# Patient Record
Sex: FEMALE | Race: OTHER | HISPANIC OR LATINO | URBAN - METROPOLITAN AREA
[De-identification: names, ages, dates, MRNs, and addresses within clinical notes are randomized per-mention and may not be internally consistent; named-entity substitution may affect disease eponyms.]

---

## 2021-02-17 ENCOUNTER — INPATIENT (INPATIENT)
Facility: HOSPITAL | Age: 56
LOS: 3 days | Discharge: HOME | DRG: 177 | End: 2021-02-21
Attending: HOSPITALIST | Admitting: HOSPITALIST
Payer: COMMERCIAL

## 2021-02-17 VITALS
HEART RATE: 103 BPM | OXYGEN SATURATION: 96 % | HEIGHT: 64 IN | RESPIRATION RATE: 18 BRPM | SYSTOLIC BLOOD PRESSURE: 147 MMHG | DIASTOLIC BLOOD PRESSURE: 80 MMHG | TEMPERATURE: 102 F

## 2021-02-17 DIAGNOSIS — U07.1 COVID-19: ICD-10-CM

## 2021-02-17 LAB
ALBUMIN SERPL ELPH-MCNC: 3 G/DL — LOW (ref 3.3–5)
ALBUMIN SERPL ELPH-MCNC: 3 G/DL — LOW (ref 3.3–5)
ALP SERPL-CCNC: 89 U/L — SIGNIFICANT CHANGE UP (ref 40–120)
ALP SERPL-CCNC: 95 U/L — SIGNIFICANT CHANGE UP (ref 40–120)
ALT FLD-CCNC: 32 U/L — SIGNIFICANT CHANGE UP (ref 12–78)
ALT FLD-CCNC: 35 U/L — SIGNIFICANT CHANGE UP (ref 12–78)
ANION GAP SERPL CALC-SCNC: 8 MMOL/L — SIGNIFICANT CHANGE UP (ref 5–17)
AST SERPL-CCNC: 29 U/L — SIGNIFICANT CHANGE UP (ref 15–37)
AST SERPL-CCNC: 30 U/L — SIGNIFICANT CHANGE UP (ref 15–37)
BASOPHILS # BLD AUTO: 0.01 K/UL — SIGNIFICANT CHANGE UP (ref 0–0.2)
BASOPHILS NFR BLD AUTO: 0.1 % — SIGNIFICANT CHANGE UP (ref 0–2)
BILIRUB DIRECT SERPL-MCNC: <.1 MG/DL — SIGNIFICANT CHANGE UP (ref 0.05–0.2)
BILIRUB INDIRECT FLD-MCNC: >0.1 MG/DL — LOW (ref 0.2–1)
BILIRUB SERPL-MCNC: 0.2 MG/DL — SIGNIFICANT CHANGE UP (ref 0.2–1.2)
BILIRUB SERPL-MCNC: 0.3 MG/DL — SIGNIFICANT CHANGE UP (ref 0.2–1.2)
BUN SERPL-MCNC: 11 MG/DL — SIGNIFICANT CHANGE UP (ref 7–23)
CALCIUM SERPL-MCNC: 8.3 MG/DL — LOW (ref 8.5–10.1)
CHLORIDE SERPL-SCNC: 109 MMOL/L — HIGH (ref 96–108)
CO2 SERPL-SCNC: 25 MMOL/L — SIGNIFICANT CHANGE UP (ref 22–31)
CREAT SERPL-MCNC: 0.81 MG/DL — SIGNIFICANT CHANGE UP (ref 0.5–1.3)
CREAT SERPL-MCNC: 0.87 MG/DL — SIGNIFICANT CHANGE UP (ref 0.5–1.3)
CRP SERPL-MCNC: 5.6 MG/DL — HIGH (ref 0–0.4)
D DIMER BLD IA.RAPID-MCNC: 360 NG/ML DDU — HIGH
EOSINOPHIL # BLD AUTO: 0 K/UL — SIGNIFICANT CHANGE UP (ref 0–0.5)
EOSINOPHIL NFR BLD AUTO: 0 % — SIGNIFICANT CHANGE UP (ref 0–6)
FERRITIN SERPL-MCNC: 233 NG/ML — HIGH (ref 15–150)
GLUCOSE SERPL-MCNC: 115 MG/DL — HIGH (ref 70–99)
HCT VFR BLD CALC: 37.6 % — SIGNIFICANT CHANGE UP (ref 34.5–45)
HGB BLD-MCNC: 12.3 G/DL — SIGNIFICANT CHANGE UP (ref 11.5–15.5)
IMM GRANULOCYTES NFR BLD AUTO: 0.2 % — SIGNIFICANT CHANGE UP (ref 0–1.5)
LYMPHOCYTES # BLD AUTO: 1.24 K/UL — SIGNIFICANT CHANGE UP (ref 1–3.3)
LYMPHOCYTES # BLD AUTO: 15.3 % — SIGNIFICANT CHANGE UP (ref 13–44)
MCHC RBC-ENTMCNC: 27.1 PG — SIGNIFICANT CHANGE UP (ref 27–34)
MCHC RBC-ENTMCNC: 32.7 GM/DL — SIGNIFICANT CHANGE UP (ref 32–36)
MCV RBC AUTO: 82.8 FL — SIGNIFICANT CHANGE UP (ref 80–100)
MONOCYTES # BLD AUTO: 0.6 K/UL — SIGNIFICANT CHANGE UP (ref 0–0.9)
MONOCYTES NFR BLD AUTO: 7.4 % — SIGNIFICANT CHANGE UP (ref 2–14)
NEUTROPHILS # BLD AUTO: 6.22 K/UL — SIGNIFICANT CHANGE UP (ref 1.8–7.4)
NEUTROPHILS NFR BLD AUTO: 77 % — SIGNIFICANT CHANGE UP (ref 43–77)
NRBC # BLD: 0 /100 WBCS — SIGNIFICANT CHANGE UP (ref 0–0)
PLATELET # BLD AUTO: 231 K/UL — SIGNIFICANT CHANGE UP (ref 150–400)
POTASSIUM SERPL-MCNC: 4 MMOL/L — SIGNIFICANT CHANGE UP (ref 3.5–5.3)
POTASSIUM SERPL-SCNC: 4 MMOL/L — SIGNIFICANT CHANGE UP (ref 3.5–5.3)
PROCALCITONIN SERPL-MCNC: <0.05 NG/ML — HIGH (ref 0–0.04)
PROT SERPL-MCNC: 7.4 G/DL — SIGNIFICANT CHANGE UP (ref 6–8.3)
PROT SERPL-MCNC: 7.7 G/DL — SIGNIFICANT CHANGE UP (ref 6–8.3)
RBC # BLD: 4.54 M/UL — SIGNIFICANT CHANGE UP (ref 3.8–5.2)
RBC # FLD: 13.1 % — SIGNIFICANT CHANGE UP (ref 10.3–14.5)
SARS-COV-2 IGG SERPL QL IA: POSITIVE
SARS-COV-2 IGM SERPL IA-ACNC: 6.19 INDEX — HIGH
SODIUM SERPL-SCNC: 142 MMOL/L — SIGNIFICANT CHANGE UP (ref 135–145)
WBC # BLD: 8.09 K/UL — SIGNIFICANT CHANGE UP (ref 3.8–10.5)
WBC # FLD AUTO: 8.09 K/UL — SIGNIFICANT CHANGE UP (ref 3.8–10.5)

## 2021-02-17 PROCEDURE — 99223 1ST HOSP IP/OBS HIGH 75: CPT

## 2021-02-17 PROCEDURE — 74176 CT ABD & PELVIS W/O CONTRAST: CPT | Mod: 26,MA

## 2021-02-17 PROCEDURE — 71250 CT THORAX DX C-: CPT | Mod: 26,MA

## 2021-02-17 PROCEDURE — 93010 ELECTROCARDIOGRAM REPORT: CPT

## 2021-02-17 PROCEDURE — 71045 X-RAY EXAM CHEST 1 VIEW: CPT | Mod: 26

## 2021-02-17 PROCEDURE — 99285 EMERGENCY DEPT VISIT HI MDM: CPT

## 2021-02-17 RX ORDER — ACETAMINOPHEN 500 MG
650 TABLET ORAL EVERY 6 HOURS
Refills: 0 | Status: DISCONTINUED | OUTPATIENT
Start: 2021-02-17 | End: 2021-02-21

## 2021-02-17 RX ORDER — DEXAMETHASONE 0.5 MG/5ML
6 ELIXIR ORAL ONCE
Refills: 0 | Status: COMPLETED | OUTPATIENT
Start: 2021-02-17 | End: 2021-02-17

## 2021-02-17 RX ORDER — ONDANSETRON 8 MG/1
4 TABLET, FILM COATED ORAL ONCE
Refills: 0 | Status: COMPLETED | OUTPATIENT
Start: 2021-02-17 | End: 2021-02-17

## 2021-02-17 RX ORDER — SODIUM CHLORIDE 9 MG/ML
1000 INJECTION INTRAMUSCULAR; INTRAVENOUS; SUBCUTANEOUS ONCE
Refills: 0 | Status: COMPLETED | OUTPATIENT
Start: 2021-02-17 | End: 2021-02-17

## 2021-02-17 RX ORDER — ACETAMINOPHEN 500 MG
1000 TABLET ORAL ONCE
Refills: 0 | Status: COMPLETED | OUTPATIENT
Start: 2021-02-17 | End: 2021-02-17

## 2021-02-17 RX ORDER — ONDANSETRON 8 MG/1
4 TABLET, FILM COATED ORAL ONCE
Refills: 0 | Status: COMPLETED | OUTPATIENT
Start: 2021-02-17 | End: 2021-02-18

## 2021-02-17 RX ORDER — REMDESIVIR 5 MG/ML
200 INJECTION INTRAVENOUS EVERY 24 HOURS
Refills: 0 | Status: COMPLETED | OUTPATIENT
Start: 2021-02-17 | End: 2021-02-17

## 2021-02-17 RX ORDER — METOCLOPRAMIDE HCL 10 MG
10 TABLET ORAL ONCE
Refills: 0 | Status: COMPLETED | OUTPATIENT
Start: 2021-02-17 | End: 2021-02-17

## 2021-02-17 RX ORDER — REMDESIVIR 5 MG/ML
INJECTION INTRAVENOUS
Refills: 0 | Status: COMPLETED | OUTPATIENT
Start: 2021-02-17 | End: 2021-02-21

## 2021-02-17 RX ORDER — FAMOTIDINE 10 MG/ML
20 INJECTION INTRAVENOUS ONCE
Refills: 0 | Status: COMPLETED | OUTPATIENT
Start: 2021-02-17 | End: 2021-02-17

## 2021-02-17 RX ORDER — ENOXAPARIN SODIUM 100 MG/ML
40 INJECTION SUBCUTANEOUS EVERY 12 HOURS
Refills: 0 | Status: DISCONTINUED | OUTPATIENT
Start: 2021-02-17 | End: 2021-02-21

## 2021-02-17 RX ORDER — REMDESIVIR 5 MG/ML
100 INJECTION INTRAVENOUS EVERY 24 HOURS
Refills: 0 | Status: COMPLETED | OUTPATIENT
Start: 2021-02-18 | End: 2021-02-21

## 2021-02-17 RX ADMIN — ONDANSETRON 4 MILLIGRAM(S): 8 TABLET, FILM COATED ORAL at 05:52

## 2021-02-17 RX ADMIN — FAMOTIDINE 20 MILLIGRAM(S): 10 INJECTION INTRAVENOUS at 08:05

## 2021-02-17 RX ADMIN — SODIUM CHLORIDE 1000 MILLILITER(S): 9 INJECTION INTRAMUSCULAR; INTRAVENOUS; SUBCUTANEOUS at 13:35

## 2021-02-17 RX ADMIN — Medication 400 MILLIGRAM(S): at 06:07

## 2021-02-17 RX ADMIN — Medication 6 MILLIGRAM(S): at 05:51

## 2021-02-17 RX ADMIN — SODIUM CHLORIDE 1000 MILLILITER(S): 9 INJECTION INTRAMUSCULAR; INTRAVENOUS; SUBCUTANEOUS at 05:52

## 2021-02-17 RX ADMIN — ENOXAPARIN SODIUM 40 MILLIGRAM(S): 100 INJECTION SUBCUTANEOUS at 22:33

## 2021-02-17 RX ADMIN — SODIUM CHLORIDE 1000 MILLILITER(S): 9 INJECTION INTRAMUSCULAR; INTRAVENOUS; SUBCUTANEOUS at 12:35

## 2021-02-17 RX ADMIN — Medication 10 MILLIGRAM(S): at 08:08

## 2021-02-17 RX ADMIN — ONDANSETRON 4 MILLIGRAM(S): 8 TABLET, FILM COATED ORAL at 12:15

## 2021-02-17 RX ADMIN — REMDESIVIR 500 MILLIGRAM(S): 5 INJECTION INTRAVENOUS at 22:33

## 2021-02-17 NOTE — H&P ADULT - ASSESSMENT
Ms Mcdaniels is a 56 yo F presenting from home with nausea and vomiting recently diagnosed with COVID-19. PMH Kidney stones.     COVID-19 infection:   -patient is saturating well on room air.   -no indication for Decadron at this time  -start Remdesivir  -consult Infectious Disease, Dr Green  -monitor LFT's and renal function.   -nausea is improving. Will advance to PO diet and monitor  -CT chest, abdomen, pelvis reviewed.     DVT ppx: Lovenox    Anticipate discharge to home in next 24 hours.

## 2021-02-17 NOTE — ED ADULT NURSE NOTE - ED STAT RN HANDOFF DETAILS
Bedside report given to on coming nurse Lata Understands pmh, medications given and plan of care for patient. Patient in stable condition, vital signs updated, has no complaints at this time and has been updated on care plan. Explained to patient that it is change of shift and new nurse is taking over, pt verbalized understanding.

## 2021-02-17 NOTE — ED PROVIDER NOTE - PHYSICAL EXAMINATION
Gen: Alert, NAD  Head/eyes: NC/AT, PERRL  ENT: airway patent  Neck: supple, no tenderness/meningismus/JVD, Trachea midline  Pulm/lung: coarse breath sound b/l at bases, normal resp effort, no wheeze/stridor/retractions  CV/heart: RRR, no M/R/G, +2 dist pulses (radial, pedal DP/PT, popliteal)  GI/Abd: soft, NT/ND, +BS, no guarding/rebound tenderness  Musculoskeletal: no edema/erythema/cyanosis, FROM in all extremities, no C/T/L spine ttp  Skin: no rash, no vesicles, no petechaie, no ecchymosis, no swelling  Neuro: AAOx3, CN 2-12 intact, normal sensation, 5/5 motor strength in all extremities, normal gait, no dysmetria

## 2021-02-17 NOTE — ED ADULT NURSE NOTE - PMH
Cervicogenic Migraine    Herniated Disc    Hyperlipidemia    Hyperparathyroidism    Renal Calculus or Stone

## 2021-02-17 NOTE — H&P ADULT - NSHPPHYSICALEXAM_GEN_ALL_CORE
T(C): 36.9 (02-17-21 @ 07:44), Max: 38.8 (02-17-21 @ 05:01)  HR: 88 (02-17-21 @ 12:00) (88 - 103)  BP: 128/70 (02-17-21 @ 12:00) (128/70 - 147/80)  RR: 18 (02-17-21 @ 12:00) (18 - 18)  SpO2: 94% (02-17-21 @ 12:00) (92% - 96%)  Wt(kg): --    Physical Exam:   GENERAL: well-groomed, well-developed, NAD  HEENT: head NC/AT; EOM intact, conjunctiva & sclera clear; hearing grossly intact, moist mucous membranes  NECK: supple, no JVD  RESPIRATORY: decreased breath sounds at b/l lung bases, no wheezing  CARDIOVASCULAR: S1&S2, RRR, no murmurs or gallops  ABDOMEN: soft, non-tender, non-distended, + Bowel sounds x4 quadrants, no guarding, rebound or rigidity  MUSCULOSKELETAL:  no clubbing, cyanosis or edema of all 4 extremities  LYMPH: no cervical lymphadenopathy  VASCULAR: Radial pulses 2+ bilaterally, no varicose veins   SKIN: warm and dry, color normal  NEUROLOGIC: AA&O X3, CN2-12 intact w/ no focal deficits, no sensory loss, motor Strength 5/5 in UE & LE B/L  Psych: Normal mood and affect, normal behavior

## 2021-02-17 NOTE — ED ADULT NURSE REASSESSMENT NOTE - NS ED NURSE REASSESS COMMENT FT1
Report received from MADHU Kaplan.  Patient already has room assignment in hospital, report not given by previous RN.
Pt reports feeling better, She states she is not nauseous anymore.

## 2021-02-17 NOTE — H&P ADULT - NSICDXPASTSURGICALHX_GEN_ALL_CORE_FT
PAST SURGICAL HISTORY:  Breast Reduction     Status Post LASIK Surgery     Urethral Stone s/p urethral stents, multiple

## 2021-02-17 NOTE — H&P ADULT - HISTORY OF PRESENT ILLNESS
Ms Mcdaniels is a 56 yo F presenting from home with nausea and vomiting recently diagnosed with COVID-19. [facility] Kidney stones.   Patient seen and examined at bedside.   She reports getting tested for COVID-19 at [facility] on 2/10/21 and was positive. She has been having intermittent fevers and Anosmia, and myalgias. Today she developed a non-productive cough. She is also nauseous today with vomiting earlier in the day, but worsened during her ER visit. She was observed for approximately 9 hours in the ER but continued to have nausea and non-bloody vomiting. During my examination her nausea was resolved She has no other complaints.   Denies headaches, chest pain, SOB, palpitations, abdominal pain, constipation, diarrhea, melena, hematochezia, dysuria.   In ER she had a fever. CT chest and abdomen shows: Patchy groundglass opacifications in both lungs. Atypical viral pneumonia such as Covid cannot be excluded.    Small gallstone in the gallbladder. Mild dependent density in the gallbladder may represent sludge. No evidence for thickened gallbladder wall or pericholecystic fluid.    The appendix appears normal. Colonic diverticulosis without evidence for diverticulitis. No bowel obstruction, or grossly thickened bowel wall.

## 2021-02-17 NOTE — H&P ADULT - NSICDXPASTMEDICALHX_GEN_ALL_CORE_FT
PAST MEDICAL HISTORY:  Cervicogenic Migraine     Herniated Disc     Hyperlipidemia     Hyperparathyroidism     Renal Calculus or Stone

## 2021-02-17 NOTE — ED PROVIDER NOTE - NS ED ROS FT
Constitutional: + Fever, - Chills, - Anorexia, - Fatigue, - Night sweats  Eyes: - Discharge, - Irritation, - Redness, - Visual changes, - Light sensitivity, - Pain  EARS: - Ear Pain, - Tinnitus, - Decreased hearing  NOSE: - Congestion, - Bloody nose  MOUTH/THROAT: - Vocal Changes, - Drooling, - Sore throat  NECK: - Lumps, - Stiffness, - Pain  CV: - Palpitations, - Chest Pain, - Edema, - Syncope  RESP:  +Cough, - Shortness of Breath, - Dyspnea on Exertion, - Trouble speaking, - Pleuritic pain - Wheezing  GI: - Diarrhea, - Constipation, - Bloody stools, + Nausea, + Vomiting, - Abdominal Pain  : - Dysuria, -Frequency, - Hematuria, - Hesitancy, - Incontinence, - Saddle Anesthesia, - Abnormal discharge  MSK: - Myalgias, - Arthralgias, - Weakness, - Deformities, - Injuries  SKIN: - Color change, - Rash, - Swelling, - Ecchymosis, - Abrasion, - laceration  NEURO: - Change in behavior, - Dec. Alertness, - Headache, - Dizziness, - Change in speech, - Weakness, - Seizure-like activity, - Difficulty ambulating

## 2021-02-17 NOTE — CONSULT NOTE ADULT - SUBJECTIVE AND OBJECTIVE BOX
[facility]  INFECTIOUS DISEASES   =======================================================    [id number]  MYRANDA SNYDER     CC:  intractable nausea and vomiting     HPI:  56 yo woman with PMH of renal stone, HDL and hyperparathyroidism was admitted today with nausea and vomiting. She was diagnosed with COVID-19 recently. "She reports getting tested for COVID-19 at [facility] on 2/10/21 and was positive. She has been having intermittent fevers and Anosmia, and myalgias. Today she developed a non-productive cough. She is also nauseous today with vomiting earlier in the day, but worsened during her ER visit. She was observed for approximately 9 hours in the ER but continued to have nausea and non-bloody vomiting. During my examination her nausea was resolved She has no other complaints.   Denies headaches, chest pain, SOB, palpitations, abdominal pain, constipation, diarrhea, melena, hematochezia, dysuria.   In ER she had a fever.     PAST MEDICAL & SURGICAL HISTORY:  Hyperlipidemia  Cervicogenic Migraine  Herniated Disc  Renal Calculus or Stone  Hyperparathyroidism  Urethral Stone  s/p urethral stents, multiple  Status Post LASIK Surgery  Breast Reduction    Social Hx: no smoking, ETOH or drugs     FAMILY HISTORY:  No pertinent family history in first degree relatives    Allergies  contrast dye (Rash)  sulfa drugs (Rash)    Antibiotics:  remdesivir  IVPB 200 milliGRAM(s) IV Intermittent every 24 hours    REVIEW OF SYSTEMS:  CONSTITUTIONAL:  No Fever or chills  HEENT:  No diplopia or blurred vision.  No sore throat or runny nose.  CARDIOVASCULAR:  No chest pain or SOB.  RESPIRATORY:  +cough, no shortness of breath  GASTROINTESTINAL:  +nausea, vomiting and mild diarrhea.  GENITOURINARY:  No dysuria, frequency or urgency. No Blood in urine  MUSCULOSKELETAL:  no joint aches, no muscle pain  SKIN:  No change in skin, hair or nails.  NEUROLOGIC:  No paresthesias, fasciculations, seizures or weakness.  PSYCHIATRIC:  No disorder of thought or mood.  ENDOCRINE:  No heat or cold intolerance, polyuria or polydipsia.  HEMATOLOGICAL:  No easy bruising or bleeding.     Physical Exam:  Vital Signs Last 24 Hrs  T(C): 36.7 (17 Feb 2021 16:55), Max: 38.8 (17 Feb 2021 05:01)  T(F): 98 (17 Feb 2021 16:55), Max: 101.8 (17 Feb 2021 05:01)  HR: 86 (17 Feb 2021 16:55) (86 - 103)  BP: 130/68 (17 Feb 2021 16:55) (128/70 - 147/80)  RR: 16 (17 Feb 2021 16:55) (16 - 18)  SpO2: 95% (17 Feb 2021 16:55) (92% - 96%)  Height (cm): 162.6 (02-17 @ 05:01)  Weight (kg): 86.183 (02-17 @ 05:10)  BMI (kg/m2): 32.6 (02-17 @ 05:10)  BSA (m2): 1.91 (02-17 @ 05:10)  GEN: NAD  HEENT: normocephalic and atraumatic. EOMI. PERRL.    NECK: Supple.  No lymphadenopathy   LUNGS: Clear to auscultation.  HEART: Regular rate and rhythm   ABDOMEN: Soft, nontender, and nondistended.  Positive bowel sounds.    EXTREMITIES: Without edema.  NEUROLOGIC: grossly intact.  PSYCHIATRIC: Appropriate affect .  SKIN: No rash    Labs:  02-17    x   |  x   |  x   ----------------------------<  x   x    |  x   |  0.87    Ca    8.3<L>      17 Feb 2021 05:56    TPro  7.7  /  Alb  3.0<L>  /  TBili  0.2  /  DBili  <.10  /  AST  29  /  ALT  35  /  AlkPhos  95  02-17                        12.3   8.09  )-----------( 231      ( 17 Feb 2021 05:56 )             37.6     LIVER FUNCTIONS - ( 17 Feb 2021 16:50 )  Alb: 3.0 g/dL / Pro: 7.7 g/dL / ALK PHOS: 95 U/L / ALT: 35 U/L / AST: 29 U/L / GGT: x           All imaging and other data have been reviewed.  CT chest and abdomen 2/17 shows: Patchy ground-glass opacifications in both lungs. Atypical viral pneumonia such as Covid cannot be excluded.  Small gallstone in the gallbladder. Mild dependent density in the gallbladder may represent sludge. No evidence for thickened gallbladder wall or pericholecystic fluid.  The appendix appears normal. Colonic diverticulosis without evidence for diverticulitis. No bowel obstruction, or grossly thickened bowel wall.     Assessment and Plan:   56 yo woman with PMH of renal stone, HDL and hyperparathyroidism was admitted today with nausea and vomiting. She was diagnosed with COVID-19 on 2/10.     Treatment usually is different case by case, data suggest that these might work:   Remdisivir:  5 day course,  eGFR > 30 mL/min , ALT < 5X ULN  Steroid: For hypoxic patients on supplemental O2 of intubated. dexamethasone 6mg PO or IV Q-day x 10 days (equivalent to solumedrol 32mg IV or Prednisone 40mg)  Anticoagulation:  with prophylactic dosing, full dose to be considered in patients with increased risk for thromboembolic complications. Bleeding can happen but acceptable in high risk patients due to hypercoagulable state.  LMWH is good, for high risk patients consider discharge on oral anticoagulation with rivaroxaban (Xarelto) 10mg PO QD or Eliquis (apixaban) 2.5-5mg PO BID  x 4 weeks.  Currently data and recommendations for COVID-19 treatment are rapidly changing, so this treatment plan is based on my clinical judgement with available information.     COVID 19   - BMP, CBC w diff, NLR. Procalcitonin, Ferritin, CRP, LDH and D dimer for the start and periodically can be repeated.   - CXR with bilateral opacities more consistent with viral pneumonia   - Start Remdesivir 200mg stat and 100mg daily for 4 more days (total 5days) or until when ready for discharge whichever comes first.   - Hold on Dexamethasone unless develops hypoxia and needs supplemental O2, then start 6mg po daily for 10days  - Follow Creat and LFTs daily while on Remdesivir.    - Watch O2 sat closely   - No antibiotics at this time.  - Prophylactic anticoagulation due to hypercoagulable state    Thank you for courtesy of this consult.     Will follow.     Dr. Livia Green   Infectious Diseases   Please call [phone] between 8am and 6pm, call [phone] after 6pm or weekends.

## 2021-02-17 NOTE — ED ADULT NURSE NOTE - OBJECTIVE STATEMENT
Pt presents to ED with covid. Pt has been positive for 10 days. Pt reports feeling nauseous, has decreased  eating and drinking, fever/chills. Pt denies chest pain, SOB, cough, abd pain.

## 2021-02-17 NOTE — ED PROVIDER NOTE - CARE PLAN
Principal Discharge DX:	COVID-19  Secondary Diagnosis:	Nausea and vomiting, intractability of vomiting not specified, unspecified vomiting type

## 2021-02-17 NOTE — ED PROVIDER NOTE - CLINICAL SUMMARY MEDICAL DECISION MAKING FREE TEXT BOX
covid 19 infection with cough/fever/sob, +n/v, labs, IV fluids, IV antiemetics, IV antipyretics, likely admit

## 2021-02-17 NOTE — ED PROVIDER NOTE - OBJECTIVE STATEMENT
56 yo female hx of hld, renal stone dx'ed with covid 19 1 week ago c/o persistent fever 101.8 here, with cough, nausea/vomiting, unable to tolerate PO, severe weakness. Patient tried taking tylenol but vomited it up.    pmd Dr. Frank

## 2021-02-17 NOTE — ED ADULT NURSE NOTE - ED STAT RN HANDOFF DETAILS 2
Assumed care of pt from previous nurse, Yuliana RN in rm 1 a/o x4, no respiratory distress noted, even and unlabored breathing, abd soft BS + all 4 quads, nontender. IV access noted on the right a/c , flushes with ease. Skin warm and dry, + sensation, + capillary refill < 2 sec, all pulses are palpable. Pt c/o nausea and headache. Call bell within reach.

## 2021-02-18 LAB
ALBUMIN SERPL ELPH-MCNC: 2.8 G/DL — LOW (ref 3.3–5)
ALP SERPL-CCNC: 86 U/L — SIGNIFICANT CHANGE UP (ref 40–120)
ALT FLD-CCNC: 30 U/L — SIGNIFICANT CHANGE UP (ref 12–78)
ANION GAP SERPL CALC-SCNC: 11 MMOL/L — SIGNIFICANT CHANGE UP (ref 5–17)
APPEARANCE UR: ABNORMAL
AST SERPL-CCNC: 20 U/L — SIGNIFICANT CHANGE UP (ref 15–37)
BASOPHILS # BLD AUTO: 0.01 K/UL — SIGNIFICANT CHANGE UP (ref 0–0.2)
BASOPHILS NFR BLD AUTO: 0.1 % — SIGNIFICANT CHANGE UP (ref 0–2)
BILIRUB DIRECT SERPL-MCNC: 0.1 MG/DL — SIGNIFICANT CHANGE UP (ref 0.05–0.2)
BILIRUB INDIRECT FLD-MCNC: 0.2 MG/DL — SIGNIFICANT CHANGE UP (ref 0.2–1)
BILIRUB SERPL-MCNC: 0.3 MG/DL — SIGNIFICANT CHANGE UP (ref 0.2–1.2)
BILIRUB UR-MCNC: NEGATIVE — SIGNIFICANT CHANGE UP
BUN SERPL-MCNC: 14 MG/DL — SIGNIFICANT CHANGE UP (ref 7–23)
CALCIUM SERPL-MCNC: 9.3 MG/DL — SIGNIFICANT CHANGE UP (ref 8.5–10.1)
CHLORIDE SERPL-SCNC: 110 MMOL/L — HIGH (ref 96–108)
CO2 SERPL-SCNC: 25 MMOL/L — SIGNIFICANT CHANGE UP (ref 22–31)
COLOR SPEC: YELLOW — SIGNIFICANT CHANGE UP
CREAT SERPL-MCNC: 0.73 MG/DL — SIGNIFICANT CHANGE UP (ref 0.5–1.3)
D DIMER BLD IA.RAPID-MCNC: 334 NG/ML DDU — HIGH
DIFF PNL FLD: ABNORMAL
EOSINOPHIL # BLD AUTO: 0 K/UL — SIGNIFICANT CHANGE UP (ref 0–0.5)
EOSINOPHIL NFR BLD AUTO: 0 % — SIGNIFICANT CHANGE UP (ref 0–6)
GLUCOSE SERPL-MCNC: 111 MG/DL — HIGH (ref 70–99)
GLUCOSE UR QL: NEGATIVE — SIGNIFICANT CHANGE UP
HCT VFR BLD CALC: 38.1 % — SIGNIFICANT CHANGE UP (ref 34.5–45)
HCV AB S/CO SERPL IA: 0.09 S/CO — SIGNIFICANT CHANGE UP (ref 0–0.99)
HCV AB SERPL-IMP: SIGNIFICANT CHANGE UP
HGB BLD-MCNC: 12.2 G/DL — SIGNIFICANT CHANGE UP (ref 11.5–15.5)
IMM GRANULOCYTES NFR BLD AUTO: 0.6 % — SIGNIFICANT CHANGE UP (ref 0–1.5)
KETONES UR-MCNC: ABNORMAL
LEUKOCYTE ESTERASE UR-ACNC: NEGATIVE — SIGNIFICANT CHANGE UP
LYMPHOCYTES # BLD AUTO: 1.09 K/UL — SIGNIFICANT CHANGE UP (ref 1–3.3)
LYMPHOCYTES # BLD AUTO: 10.4 % — LOW (ref 13–44)
MCHC RBC-ENTMCNC: 26.4 PG — LOW (ref 27–34)
MCHC RBC-ENTMCNC: 32 GM/DL — SIGNIFICANT CHANGE UP (ref 32–36)
MCV RBC AUTO: 82.5 FL — SIGNIFICANT CHANGE UP (ref 80–100)
MONOCYTES # BLD AUTO: 0.78 K/UL — SIGNIFICANT CHANGE UP (ref 0–0.9)
MONOCYTES NFR BLD AUTO: 7.4 % — SIGNIFICANT CHANGE UP (ref 2–14)
NEUTROPHILS # BLD AUTO: 8.58 K/UL — HIGH (ref 1.8–7.4)
NEUTROPHILS NFR BLD AUTO: 81.5 % — HIGH (ref 43–77)
NITRITE UR-MCNC: NEGATIVE — SIGNIFICANT CHANGE UP
NRBC # BLD: 0 /100 WBCS — SIGNIFICANT CHANGE UP (ref 0–0)
PH UR: 5 — SIGNIFICANT CHANGE UP (ref 5–8)
PLATELET # BLD AUTO: 272 K/UL — SIGNIFICANT CHANGE UP (ref 150–400)
POTASSIUM SERPL-MCNC: 3.8 MMOL/L — SIGNIFICANT CHANGE UP (ref 3.5–5.3)
POTASSIUM SERPL-SCNC: 3.8 MMOL/L — SIGNIFICANT CHANGE UP (ref 3.5–5.3)
PROT SERPL-MCNC: 7.5 G/DL — SIGNIFICANT CHANGE UP (ref 6–8.3)
PROT UR-MCNC: 30 MG/DL
RBC # BLD: 4.62 M/UL — SIGNIFICANT CHANGE UP (ref 3.8–5.2)
RBC # FLD: 13.2 % — SIGNIFICANT CHANGE UP (ref 10.3–14.5)
SODIUM SERPL-SCNC: 146 MMOL/L — HIGH (ref 135–145)
SP GR SPEC: 1.01 — SIGNIFICANT CHANGE UP (ref 1.01–1.02)
UROBILINOGEN FLD QL: 1
WBC # BLD: 10.52 K/UL — HIGH (ref 3.8–10.5)
WBC # FLD AUTO: 10.52 K/UL — HIGH (ref 3.8–10.5)

## 2021-02-18 PROCEDURE — 99232 SBSQ HOSP IP/OBS MODERATE 35: CPT

## 2021-02-18 PROCEDURE — 99233 SBSQ HOSP IP/OBS HIGH 50: CPT

## 2021-02-18 RX ORDER — METOCLOPRAMIDE HCL 10 MG
10 TABLET ORAL ONCE
Refills: 0 | Status: COMPLETED | OUTPATIENT
Start: 2021-02-18 | End: 2021-02-18

## 2021-02-18 RX ORDER — ONDANSETRON 8 MG/1
4 TABLET, FILM COATED ORAL THREE TIMES A DAY
Refills: 0 | Status: DISCONTINUED | OUTPATIENT
Start: 2021-02-18 | End: 2021-02-21

## 2021-02-18 RX ORDER — SODIUM CHLORIDE 9 MG/ML
1000 INJECTION INTRAMUSCULAR; INTRAVENOUS; SUBCUTANEOUS
Refills: 0 | Status: DISCONTINUED | OUTPATIENT
Start: 2021-02-18 | End: 2021-02-19

## 2021-02-18 RX ADMIN — ENOXAPARIN SODIUM 40 MILLIGRAM(S): 100 INJECTION SUBCUTANEOUS at 20:55

## 2021-02-18 RX ADMIN — ONDANSETRON 4 MILLIGRAM(S): 8 TABLET, FILM COATED ORAL at 12:47

## 2021-02-18 RX ADMIN — ENOXAPARIN SODIUM 40 MILLIGRAM(S): 100 INJECTION SUBCUTANEOUS at 10:34

## 2021-02-18 RX ADMIN — Medication 10 MILLIGRAM(S): at 07:00

## 2021-02-18 RX ADMIN — REMDESIVIR 500 MILLIGRAM(S): 5 INJECTION INTRAVENOUS at 20:55

## 2021-02-18 RX ADMIN — ONDANSETRON 4 MILLIGRAM(S): 8 TABLET, FILM COATED ORAL at 05:20

## 2021-02-18 RX ADMIN — SODIUM CHLORIDE 100 MILLILITER(S): 9 INJECTION INTRAMUSCULAR; INTRAVENOUS; SUBCUTANEOUS at 09:29

## 2021-02-18 RX ADMIN — Medication 204 MILLIGRAM(S): at 16:14

## 2021-02-18 NOTE — CHART NOTE - NSCHARTNOTEFT_GEN_A_CORE
Called by RN, pt is nauseous and started vomiting. Pt has not eaten for the past few days. 2L BS bolus was given in the ED.   - Pt has received Zofran already with minimal relief  - Reglan ordered  - Continue to monitor, RN to call if any changes Called by RN, pt is nauseous and started vomiting. Pt has not eaten for the past few days. 2L BS bolus was given in the ED.   - Pt has received Zofran already with minimal relief  - VSS stable   - Reglan ordered  - Continue to monitor, RN to call if any changes

## 2021-02-18 NOTE — PROGRESS NOTE ADULT - ASSESSMENT
Assessment and Plan:   Ms Mcdaniels is a 54 yo F presenting from home with nausea and vomiting recently diagnosed with COVID-19. PMH Kidney stones.     COVID-19 infection:   -patient is saturating well on room air this am  -no indication for Decadron at this time  -started Remdesivir x 5 doses  -consult Infectious Disease, Dr Green  -monitor LFT's and renal function, cbc daily.   -continue VTE prophylaxis Lovenox.    Nausea and vomiting  -IV fluids  -Zofran and phenergan prn  NPO for now, will advance if improved.  -CT chest, abdomen, pelvis negative for obstruction  -UA ordered    Diarrhea  -likely secondary to covid  -continue IVF  -monitoring lytes         DVT ppx: Lovenox   Assessment and Plan:   Ms Mcdaniels is a 56 yo F presenting from home with nausea and vomiting recently diagnosed with COVID-19. PMH Kidney stones.     COVID-19 infection:   -patient is saturating well on room air this am  -no indication for Decadron at this time  -started Remdesivir x 5 doses  -consult Infectious Disease, Dr Green  -monitor LFT's and renal function, cbc daily.   -continue VTE prophylaxis Lovenox.    Nausea and vomiting  -IV fluids  -Zofran and phenergan prn  NPO for now, will advance if improved.  -CT chest, abdomen, pelvis negative for obstruction  -UA ordered    Diarrhea  -likely secondary to covid  -continue IVF  -monitoring lytes         DVT ppx: Lovenox    Called Tu, patient's emergency contact @ [phone] but not available, left VM

## 2021-02-19 LAB
ALBUMIN SERPL ELPH-MCNC: 2.5 G/DL — LOW (ref 3.3–5)
ALBUMIN SERPL ELPH-MCNC: 2.5 G/DL — LOW (ref 3.3–5)
ALP SERPL-CCNC: 71 U/L — SIGNIFICANT CHANGE UP (ref 40–120)
ALP SERPL-CCNC: 71 U/L — SIGNIFICANT CHANGE UP (ref 40–120)
ALT FLD-CCNC: 25 U/L — SIGNIFICANT CHANGE UP (ref 12–78)
ALT FLD-CCNC: 26 U/L — SIGNIFICANT CHANGE UP (ref 12–78)
ANION GAP SERPL CALC-SCNC: 10 MMOL/L — SIGNIFICANT CHANGE UP (ref 5–17)
AST SERPL-CCNC: 18 U/L — SIGNIFICANT CHANGE UP (ref 15–37)
AST SERPL-CCNC: 18 U/L — SIGNIFICANT CHANGE UP (ref 15–37)
BILIRUB DIRECT SERPL-MCNC: <.1 MG/DL — SIGNIFICANT CHANGE UP (ref 0.05–0.2)
BILIRUB INDIRECT FLD-MCNC: >0.2 MG/DL — SIGNIFICANT CHANGE UP (ref 0.2–1)
BILIRUB SERPL-MCNC: 0.3 MG/DL — SIGNIFICANT CHANGE UP (ref 0.2–1.2)
BILIRUB SERPL-MCNC: 0.3 MG/DL — SIGNIFICANT CHANGE UP (ref 0.2–1.2)
BUN SERPL-MCNC: 19 MG/DL — SIGNIFICANT CHANGE UP (ref 7–23)
CALCIUM SERPL-MCNC: 8.5 MG/DL — SIGNIFICANT CHANGE UP (ref 8.5–10.1)
CHLORIDE SERPL-SCNC: 115 MMOL/L — HIGH (ref 96–108)
CO2 SERPL-SCNC: 23 MMOL/L — SIGNIFICANT CHANGE UP (ref 22–31)
CREAT SERPL-MCNC: 0.62 MG/DL — SIGNIFICANT CHANGE UP (ref 0.5–1.3)
GLUCOSE SERPL-MCNC: 84 MG/DL — SIGNIFICANT CHANGE UP (ref 70–99)
HCT VFR BLD CALC: 36.1 % — SIGNIFICANT CHANGE UP (ref 34.5–45)
HGB BLD-MCNC: 11.5 G/DL — SIGNIFICANT CHANGE UP (ref 11.5–15.5)
MCHC RBC-ENTMCNC: 26.6 PG — LOW (ref 27–34)
MCHC RBC-ENTMCNC: 31.9 GM/DL — LOW (ref 32–36)
MCV RBC AUTO: 83.6 FL — SIGNIFICANT CHANGE UP (ref 80–100)
NRBC # BLD: 0 /100 WBCS — SIGNIFICANT CHANGE UP (ref 0–0)
PLATELET # BLD AUTO: 281 K/UL — SIGNIFICANT CHANGE UP (ref 150–400)
POTASSIUM SERPL-MCNC: 3.4 MMOL/L — LOW (ref 3.5–5.3)
POTASSIUM SERPL-SCNC: 3.4 MMOL/L — LOW (ref 3.5–5.3)
PROT SERPL-MCNC: 6.6 G/DL — SIGNIFICANT CHANGE UP (ref 6–8.3)
PROT SERPL-MCNC: 6.7 G/DL — SIGNIFICANT CHANGE UP (ref 6–8.3)
RBC # BLD: 4.32 M/UL — SIGNIFICANT CHANGE UP (ref 3.8–5.2)
RBC # FLD: 13 % — SIGNIFICANT CHANGE UP (ref 10.3–14.5)
SODIUM SERPL-SCNC: 148 MMOL/L — HIGH (ref 135–145)
WBC # BLD: 6.28 K/UL — SIGNIFICANT CHANGE UP (ref 3.8–10.5)
WBC # FLD AUTO: 6.28 K/UL — SIGNIFICANT CHANGE UP (ref 3.8–10.5)

## 2021-02-19 PROCEDURE — 99232 SBSQ HOSP IP/OBS MODERATE 35: CPT

## 2021-02-19 PROCEDURE — 76705 ECHO EXAM OF ABDOMEN: CPT | Mod: 26

## 2021-02-19 RX ORDER — POTASSIUM CHLORIDE 20 MEQ
40 PACKET (EA) ORAL ONCE
Refills: 0 | Status: DISCONTINUED | OUTPATIENT
Start: 2021-02-19 | End: 2021-02-19

## 2021-02-19 RX ORDER — DEXAMETHASONE 0.5 MG/5ML
6 ELIXIR ORAL DAILY
Refills: 0 | Status: DISCONTINUED | OUTPATIENT
Start: 2021-02-19 | End: 2021-02-21

## 2021-02-19 RX ORDER — SODIUM CHLORIDE 9 MG/ML
1000 INJECTION, SOLUTION INTRAVENOUS
Refills: 0 | Status: DISCONTINUED | OUTPATIENT
Start: 2021-02-19 | End: 2021-02-20

## 2021-02-19 RX ADMIN — Medication 6 MILLIGRAM(S): at 09:52

## 2021-02-19 RX ADMIN — REMDESIVIR 500 MILLIGRAM(S): 5 INJECTION INTRAVENOUS at 21:22

## 2021-02-19 RX ADMIN — ENOXAPARIN SODIUM 40 MILLIGRAM(S): 100 INJECTION SUBCUTANEOUS at 18:15

## 2021-02-19 RX ADMIN — SODIUM CHLORIDE 75 MILLILITER(S): 9 INJECTION, SOLUTION INTRAVENOUS at 15:45

## 2021-02-19 NOTE — PROGRESS NOTE ADULT - ASSESSMENT
Assessment and Plan:   Ms Mcdaniels is a 54 yo F presenting from home with nausea and vomiting recently diagnosed with COVID-19. PMH Kidney stones.     COVID-19 infection:   -patient started to desaturating on room air, now on 2L  -started on Decadron, continue Remdesivir x 5 doses  -consult Infectious Disease, Dr Green  -monitor LFT's and renal function, cbc daily.   -continue VTE prophylaxis Lovenox.    Nausea and vomiting  -Improved this am  -continue IV fluids  -Zofran and phenergan prn  -liquid diet for now, will advance if improved.  -CT chest, abdomen, pelvis negative for obstruction  -UA hematuria ( patient has h/o kidney stones) no nitrite or leucocyte esterase.  -RUQ US reported gallstones w/o sonographic evidence of acute cholecystitis, nonobstructive renal calculus       Diarrhea  -still present but less frequent  -likely secondary to covid  -continue IVF  -monitoring lytes         DVT ppx: Lovenox    Called Tu, patient's emergency contact @ [phone]

## 2021-02-20 ENCOUNTER — TRANSCRIPTION ENCOUNTER (OUTPATIENT)
Age: 56
End: 2021-02-20

## 2021-02-20 LAB
ALBUMIN SERPL ELPH-MCNC: 2.5 G/DL — LOW (ref 3.3–5)
ALBUMIN SERPL ELPH-MCNC: 2.6 G/DL — LOW (ref 3.3–5)
ALP SERPL-CCNC: 74 U/L — SIGNIFICANT CHANGE UP (ref 40–120)
ALP SERPL-CCNC: 74 U/L — SIGNIFICANT CHANGE UP (ref 40–120)
ALT FLD-CCNC: 22 U/L — SIGNIFICANT CHANGE UP (ref 12–78)
ALT FLD-CCNC: 24 U/L — SIGNIFICANT CHANGE UP (ref 12–78)
ANION GAP SERPL CALC-SCNC: 7 MMOL/L — SIGNIFICANT CHANGE UP (ref 5–17)
AST SERPL-CCNC: 10 U/L — LOW (ref 15–37)
AST SERPL-CCNC: 14 U/L — LOW (ref 15–37)
BILIRUB DIRECT SERPL-MCNC: 0.1 MG/DL — SIGNIFICANT CHANGE UP (ref 0.05–0.2)
BILIRUB INDIRECT FLD-MCNC: 0.2 MG/DL — SIGNIFICANT CHANGE UP (ref 0.2–1)
BILIRUB SERPL-MCNC: 0.3 MG/DL — SIGNIFICANT CHANGE UP (ref 0.2–1.2)
BILIRUB SERPL-MCNC: 0.4 MG/DL — SIGNIFICANT CHANGE UP (ref 0.2–1.2)
BUN SERPL-MCNC: 12 MG/DL — SIGNIFICANT CHANGE UP (ref 7–23)
CALCIUM SERPL-MCNC: 9.2 MG/DL — SIGNIFICANT CHANGE UP (ref 8.5–10.1)
CHLORIDE SERPL-SCNC: 111 MMOL/L — HIGH (ref 96–108)
CO2 SERPL-SCNC: 26 MMOL/L — SIGNIFICANT CHANGE UP (ref 22–31)
CREAT SERPL-MCNC: 0.58 MG/DL — SIGNIFICANT CHANGE UP (ref 0.5–1.3)
GLUCOSE SERPL-MCNC: 176 MG/DL — HIGH (ref 70–99)
HCT VFR BLD CALC: 37.9 % — SIGNIFICANT CHANGE UP (ref 34.5–45)
HGB BLD-MCNC: 12.3 G/DL — SIGNIFICANT CHANGE UP (ref 11.5–15.5)
MCHC RBC-ENTMCNC: 26.5 PG — LOW (ref 27–34)
MCHC RBC-ENTMCNC: 32.5 GM/DL — SIGNIFICANT CHANGE UP (ref 32–36)
MCV RBC AUTO: 81.5 FL — SIGNIFICANT CHANGE UP (ref 80–100)
NRBC # BLD: 0 /100 WBCS — SIGNIFICANT CHANGE UP (ref 0–0)
PLATELET # BLD AUTO: 332 K/UL — SIGNIFICANT CHANGE UP (ref 150–400)
POTASSIUM SERPL-MCNC: 4.1 MMOL/L — SIGNIFICANT CHANGE UP (ref 3.5–5.3)
POTASSIUM SERPL-SCNC: 4.1 MMOL/L — SIGNIFICANT CHANGE UP (ref 3.5–5.3)
PROT SERPL-MCNC: 6.9 G/DL — SIGNIFICANT CHANGE UP (ref 6–8.3)
PROT SERPL-MCNC: 7 G/DL — SIGNIFICANT CHANGE UP (ref 6–8.3)
RBC # BLD: 4.65 M/UL — SIGNIFICANT CHANGE UP (ref 3.8–5.2)
RBC # FLD: 12.7 % — SIGNIFICANT CHANGE UP (ref 10.3–14.5)
SODIUM SERPL-SCNC: 144 MMOL/L — SIGNIFICANT CHANGE UP (ref 135–145)
WBC # BLD: 4.18 K/UL — SIGNIFICANT CHANGE UP (ref 3.8–10.5)
WBC # FLD AUTO: 4.18 K/UL — SIGNIFICANT CHANGE UP (ref 3.8–10.5)

## 2021-02-20 PROCEDURE — 99232 SBSQ HOSP IP/OBS MODERATE 35: CPT

## 2021-02-20 RX ORDER — DEXAMETHASONE 0.5 MG/5ML
1 ELIXIR ORAL
Qty: 1 | Refills: 0
Start: 2021-02-20 | End: 2021-02-20

## 2021-02-20 RX ADMIN — ENOXAPARIN SODIUM 40 MILLIGRAM(S): 100 INJECTION SUBCUTANEOUS at 04:28

## 2021-02-20 RX ADMIN — Medication 6 MILLIGRAM(S): at 04:28

## 2021-02-20 RX ADMIN — ENOXAPARIN SODIUM 40 MILLIGRAM(S): 100 INJECTION SUBCUTANEOUS at 16:59

## 2021-02-20 RX ADMIN — REMDESIVIR 500 MILLIGRAM(S): 5 INJECTION INTRAVENOUS at 20:59

## 2021-02-20 NOTE — PROGRESS NOTE ADULT - ASSESSMENT
Assessment and Plan:   Ms Mcdaniels is a 54 yo F presenting from home with nausea and vomiting recently diagnosed with COVID-19. PMH Kidney stones.     COVID-19 infection:   -Continue on 2L  -continue Decadron to complete 10 days last dose 02/22/21, continue Remdesivir x 5, last dose 2/21/21  -follow by Infectious Disease, Dr Green  -monitor LFT's and renal function, cbc daily.   -continue VTE prophylaxis Lovenox.    Nausea and vomiting  -Improved this am  -d/c IVF, able to tolerate oral diet  -Zofran and phenergan prn  - advancing diet as olerated  -CT chest, abdomen, pelvis negative for obstruction  -UA hematuria ( patient has h/o kidney stones) no nitrite or leucocyte esterase.  -RUQ US reported gallstones w/o sonographic evidence of acute cholecystitis, nonobstructive renal calculus       Diarrhea  -no further episode since last night  -likely secondary to covid  -encourage oral hydration  -monitoring lytes     DVT ppx: Lovenox    Called Tu, patient's emergency contact @ [phone] and gave an update about patient's condition, informed him that patient will be discharge tomorrow

## 2021-02-20 NOTE — DISCHARGE NOTE PROVIDER - CARE PROVIDER_API CALL
Jose Francisco Frank  INTERNAL MEDICINE  [address], [address]  [place] [id number]  Phone: [phone]  Fax: [phone]  Follow Up Time:

## 2021-02-20 NOTE — DISCHARGE NOTE PROVIDER - NSDCCPCAREPLAN_GEN_ALL_CORE_FT
PRINCIPAL DISCHARGE DIAGNOSIS  Diagnosis: COVID-19  Assessment and Plan of Treatment: -Presented with nausea, vomiting , fever and sob  -treated with decadron and remdesivir  -required O2 before discharge  -SW consulted for home O2 and home care  -F/up after discharge within 1 week with PCP      SECONDARY DISCHARGE DIAGNOSES  Diagnosis: Diarrhea  Assessment and Plan of Treatment: -In setting of Covid  -received IV fluids  -Improved before discharge  -Reported some drops of blood after diarrhea, recommended to f/up with PCP to recheck hgb if necessary and for referral for GI for colonoscopy    Diagnosis: Nausea and vomiting, intractability of vomiting not specified, unspecified vomiting type  Assessment and Plan of Treatment: -Likely secondary to Covid infection, non bloody  -UA was negative for urine infection  -RUQ US was negative for acute cholecystitis  -Improved with zofran and phenergan  -Able to tolerate diet before discharge

## 2021-02-20 NOTE — DISCHARGE NOTE PROVIDER - HOSPITAL COURSE
FROM ADMISSION H+P:   HPI:  Ms Mcdaniels is a 54 yo F presenting from home with nausea and vomiting recently diagnosed with COVID-19. PMH Kidney stones.   Patient seen and examined at bedside.   She reports getting tested for COVID-19 at [facility] on 2/10/21 and was positive. She has been having intermittent fevers and Anosmia, and myalgias. Today she developed a non-productive cough. She is also nauseous today with vomiting earlier in the day, but worsened during her ER visit. She was observed for approximately 9 hours in the ER but continued to have nausea and non-bloody vomiting. During my examination her nausea was resolved She has no other complaints.   Denies headaches, chest pain, SOB, palpitations, abdominal pain, constipation, diarrhea, melena, hematochezia, dysuria.   In ER she had a fever. CT chest and abdomen shows: Patchy groundglass opacifications in both lungs. Atypical viral pneumonia such as Covid cannot be excluded.    Small gallstone in the gallbladder. Mild dependent density in the gallbladder may represent sludge. No evidence for thickened gallbladder wall or pericholecystic fluid.    The appendix appears normal. Colonic diverticulosis without evidence for diverticulitis. No bowel obstruction, or grossly thickened bowel wall. (17 Feb 2021 16:22)    ---  HOSPITAL COURSE:   COVID-19 infection:   -supported with 2L  -treated with  Decadron to complete 10 days last dose 02/22/21, and Remdesivir x 5 doses, last dose 2/21/21  -Was follow by Infectious Disease, Dr Green  -LFT's and renal function remained stable, as well cbc.   -VTE prophylaxis with Lovenox.  -required Home O2 before d/c ,  consulted for assistance     Nausea and vomiting  -NB, treated with IV fluids and antiemetics PRN. improved before discharge, patient was able to tolerate regular diet  -CT chest, abdomen, pelvis negative for obstruction  -UA (+) hematuria ( patient has h/o kidney stones) no nitrite or leucocyte esterase.  -RUQ US reported gallstones w/o sonographic evidence of acute cholecystitis, nonobstructive renal calculus       Diarrhea  -Improved gradually with IVF. able to tolerated diet before discharge, no further episode reported.   Patient mentioned phlegm and streaks of blood. Advice patient to f/up with PCP for referral with GI for colonoscopy    ---  CONSULTANTS:   ID    ---  TIME SPENT:  I, the attending physician, was physically present for the key portions of the evaluation and management (E/M) service provided. The total amount of time spent reviewing the hospital notes, laboratory values, imaging findings, assessing/counseling the patient, discussing with consultant physicians, social work, nursing staff was 35 minutes   FROM ADMISSION H+P:   HPI:  Ms Mcdaniels is a 54 yo F presenting from home with nausea and vomiting recently diagnosed with COVID-19. PMH Kidney stones.   Patient seen and examined at bedside.   She reports getting tested for COVID-19 at [facility] on 2/10/21 and was positive. She has been having intermittent fevers and Anosmia, and myalgias. Today she developed a non-productive cough. She is also nauseous today with vomiting earlier in the day, but worsened during her ER visit. She was observed for approximately 9 hours in the ER but continued to have nausea and non-bloody vomiting. During my examination her nausea was resolved She has no other complaints.   Denies headaches, chest pain, SOB, palpitations, abdominal pain, constipation, diarrhea, melena, hematochezia, dysuria.   In ER she had a fever. CT chest and abdomen shows: Patchy groundglass opacifications in both lungs. Atypical viral pneumonia such as Covid cannot be excluded.    Small gallstone in the gallbladder. Mild dependent density in the gallbladder may represent sludge. No evidence for thickened gallbladder wall or pericholecystic fluid.    The appendix appears normal. Colonic diverticulosis without evidence for diverticulitis. No bowel obstruction, or grossly thickened bowel wall. (17 Feb 2021 16:22)    ---  HOSPITAL COURSE:   COVID-19 infection:   -supported with 2L  -treated with  Decadron to complete 10 days last dose 02/22/21, and Remdesivir x 5 doses, last dose 2/21/21  -Was follow by Infectious Disease, Dr Green  -LFT's and renal function remained stable, as well cbc.   -VTE prophylaxis with Lovenox.  -required Home O2 before d/c ,  consulted for assistance     Nausea and vomiting  -NB, treated with IV fluids and antiemetics PRN. improved before discharge, patient was able to tolerate regular diet  -CT chest, abdomen, pelvis negative for obstruction  -UA (+) hematuria ( patient has h/o kidney stones) no nitrite or leucocyte esterase.  -RUQ US reported gallstones w/o sonographic evidence of acute cholecystitis, nonobstructive renal calculus       Diarrhea  -Improved gradually with IVF. able to tolerated diet before discharge, no further episode reported.   Patient mentioned phlegm and streaks of blood. Advice patient to f/up with PCP for referral with GI for colonoscopy    Patient required O2 support, during ambulation patient O2 concentration decrease to 85% w/o no O2 that increase to 94% with O2 NC 2L.at rest O2 saturation was 90%    ---  CONSULTANTS:   ID    ---  TIME SPENT:  I, the attending physician, was physically present for the key portions of the evaluation and management (E/M) service provided. The total amount of time spent reviewing the hospital notes, laboratory values, imaging findings, assessing/counseling the patient, discussing with consultant physicians, social work, nursing staff was 35 minutes

## 2021-02-20 NOTE — DISCHARGE NOTE PROVIDER - NSDCMRMEDTOKEN_GEN_ALL_CORE_FT
dexamethasone 6 mg oral tablet: 1 tab(s) orally once a day   TheraLith XR oral tablet: 1 tab(s) orally once a day

## 2021-02-21 ENCOUNTER — TRANSCRIPTION ENCOUNTER (OUTPATIENT)
Age: 56
End: 2021-02-21

## 2021-02-21 VITALS
RESPIRATION RATE: 20 BRPM | HEART RATE: 90 BPM | SYSTOLIC BLOOD PRESSURE: 129 MMHG | TEMPERATURE: 98 F | OXYGEN SATURATION: 93 % | DIASTOLIC BLOOD PRESSURE: 76 MMHG

## 2021-02-21 LAB
ALBUMIN SERPL ELPH-MCNC: 2.5 G/DL — LOW (ref 3.3–5)
ALBUMIN SERPL ELPH-MCNC: 2.5 G/DL — LOW (ref 3.3–5)
ALP SERPL-CCNC: 65 U/L — SIGNIFICANT CHANGE UP (ref 40–120)
ALP SERPL-CCNC: 65 U/L — SIGNIFICANT CHANGE UP (ref 40–120)
ALT FLD-CCNC: 21 U/L — SIGNIFICANT CHANGE UP (ref 12–78)
ALT FLD-CCNC: 23 U/L — SIGNIFICANT CHANGE UP (ref 12–78)
ANION GAP SERPL CALC-SCNC: 8 MMOL/L — SIGNIFICANT CHANGE UP (ref 5–17)
AST SERPL-CCNC: 9 U/L — LOW (ref 15–37)
AST SERPL-CCNC: 9 U/L — LOW (ref 15–37)
BILIRUB DIRECT SERPL-MCNC: <.1 MG/DL — SIGNIFICANT CHANGE UP (ref 0.05–0.2)
BILIRUB INDIRECT FLD-MCNC: >0.2 MG/DL — SIGNIFICANT CHANGE UP (ref 0.2–1)
BILIRUB SERPL-MCNC: 0.3 MG/DL — SIGNIFICANT CHANGE UP (ref 0.2–1.2)
BILIRUB SERPL-MCNC: 0.3 MG/DL — SIGNIFICANT CHANGE UP (ref 0.2–1.2)
BUN SERPL-MCNC: 18 MG/DL — SIGNIFICANT CHANGE UP (ref 7–23)
CALCIUM SERPL-MCNC: 8.6 MG/DL — SIGNIFICANT CHANGE UP (ref 8.5–10.1)
CHLORIDE SERPL-SCNC: 113 MMOL/L — HIGH (ref 96–108)
CO2 SERPL-SCNC: 26 MMOL/L — SIGNIFICANT CHANGE UP (ref 22–31)
CREAT SERPL-MCNC: 0.62 MG/DL — SIGNIFICANT CHANGE UP (ref 0.5–1.3)
GLUCOSE SERPL-MCNC: 122 MG/DL — HIGH (ref 70–99)
HCT VFR BLD CALC: 37 % — SIGNIFICANT CHANGE UP (ref 34.5–45)
HGB BLD-MCNC: 11.9 G/DL — SIGNIFICANT CHANGE UP (ref 11.5–15.5)
MCHC RBC-ENTMCNC: 26.3 PG — LOW (ref 27–34)
MCHC RBC-ENTMCNC: 32.2 GM/DL — SIGNIFICANT CHANGE UP (ref 32–36)
MCV RBC AUTO: 81.9 FL — SIGNIFICANT CHANGE UP (ref 80–100)
NRBC # BLD: 0 /100 WBCS — SIGNIFICANT CHANGE UP (ref 0–0)
PLATELET # BLD AUTO: 380 K/UL — SIGNIFICANT CHANGE UP (ref 150–400)
POTASSIUM SERPL-MCNC: 3.7 MMOL/L — SIGNIFICANT CHANGE UP (ref 3.5–5.3)
POTASSIUM SERPL-SCNC: 3.7 MMOL/L — SIGNIFICANT CHANGE UP (ref 3.5–5.3)
PROT SERPL-MCNC: 6.5 G/DL — SIGNIFICANT CHANGE UP (ref 6–8.3)
PROT SERPL-MCNC: 6.5 G/DL — SIGNIFICANT CHANGE UP (ref 6–8.3)
RBC # BLD: 4.52 M/UL — SIGNIFICANT CHANGE UP (ref 3.8–5.2)
RBC # FLD: 12.8 % — SIGNIFICANT CHANGE UP (ref 10.3–14.5)
SODIUM SERPL-SCNC: 147 MMOL/L — HIGH (ref 135–145)
WBC # BLD: 7.1 K/UL — SIGNIFICANT CHANGE UP (ref 3.8–10.5)
WBC # FLD AUTO: 7.1 K/UL — SIGNIFICANT CHANGE UP (ref 3.8–10.5)

## 2021-02-21 PROCEDURE — 85025 COMPLETE CBC W/AUTO DIFF WBC: CPT

## 2021-02-21 PROCEDURE — 96376 TX/PRO/DX INJ SAME DRUG ADON: CPT

## 2021-02-21 PROCEDURE — 99239 HOSP IP/OBS DSCHRG MGMT >30: CPT

## 2021-02-21 PROCEDURE — 71250 CT THORAX DX C-: CPT

## 2021-02-21 PROCEDURE — 96375 TX/PRO/DX INJ NEW DRUG ADDON: CPT

## 2021-02-21 PROCEDURE — 82728 ASSAY OF FERRITIN: CPT

## 2021-02-21 PROCEDURE — 87635 SARS-COV-2 COVID-19 AMP PRB: CPT

## 2021-02-21 PROCEDURE — 85027 COMPLETE CBC AUTOMATED: CPT

## 2021-02-21 PROCEDURE — 81001 URINALYSIS AUTO W/SCOPE: CPT

## 2021-02-21 PROCEDURE — 71045 X-RAY EXAM CHEST 1 VIEW: CPT

## 2021-02-21 PROCEDURE — 96374 THER/PROPH/DIAG INJ IV PUSH: CPT

## 2021-02-21 PROCEDURE — 99285 EMERGENCY DEPT VISIT HI MDM: CPT

## 2021-02-21 PROCEDURE — 85379 FIBRIN DEGRADATION QUANT: CPT

## 2021-02-21 PROCEDURE — 86140 C-REACTIVE PROTEIN: CPT

## 2021-02-21 PROCEDURE — U0005: CPT

## 2021-02-21 PROCEDURE — 76705 ECHO EXAM OF ABDOMEN: CPT

## 2021-02-21 PROCEDURE — 80053 COMPREHEN METABOLIC PANEL: CPT

## 2021-02-21 PROCEDURE — 84145 PROCALCITONIN (PCT): CPT

## 2021-02-21 PROCEDURE — 80048 BASIC METABOLIC PNL TOTAL CA: CPT

## 2021-02-21 PROCEDURE — 93005 ELECTROCARDIOGRAM TRACING: CPT

## 2021-02-21 PROCEDURE — 86769 SARS-COV-2 COVID-19 ANTIBODY: CPT

## 2021-02-21 PROCEDURE — 99232 SBSQ HOSP IP/OBS MODERATE 35: CPT

## 2021-02-21 PROCEDURE — 80076 HEPATIC FUNCTION PANEL: CPT

## 2021-02-21 PROCEDURE — 74176 CT ABD & PELVIS W/O CONTRAST: CPT

## 2021-02-21 PROCEDURE — 36415 COLL VENOUS BLD VENIPUNCTURE: CPT

## 2021-02-21 PROCEDURE — 86803 HEPATITIS C AB TEST: CPT

## 2021-02-21 PROCEDURE — 82565 ASSAY OF CREATININE: CPT

## 2021-02-21 RX ADMIN — REMDESIVIR 500 MILLIGRAM(S): 5 INJECTION INTRAVENOUS at 15:03

## 2021-02-21 RX ADMIN — ENOXAPARIN SODIUM 40 MILLIGRAM(S): 100 INJECTION SUBCUTANEOUS at 16:46

## 2021-02-21 RX ADMIN — Medication 6 MILLIGRAM(S): at 04:22

## 2021-02-21 RX ADMIN — ENOXAPARIN SODIUM 40 MILLIGRAM(S): 100 INJECTION SUBCUTANEOUS at 04:21

## 2021-02-21 NOTE — DISCHARGE NOTE NURSING/CASE MANAGEMENT/SOCIAL WORK - PATIENT PORTAL LINK FT
You can access the FollowMyHealth Patient Portal offered by [facility] by registering at the following website: [email or web address]. By joining [facility]’s FollowMyHealth portal, you will also be able to view your health information using other applications (apps) compatible with our system.

## 2021-02-21 NOTE — PROGRESS NOTE ADULT - SUBJECTIVE AND OBJECTIVE BOX
[facility]  INFECTIOUS DISEASES   =======================================================    [facility]-[id number]  MYRANDA SNYDER     Follow up: TAHIRA 19    Still has vomiting. today 6times, no diarrhea, no fever.     PAST MEDICAL & SURGICAL HISTORY:  Hyperlipidemia  Cervicogenic Migraine  Herniated Disc  Renal Calculus or Stone  Hyperparathyroidism  Urethral Stone  s/p urethral stents, multiple  Status Post LASIK Surgery  Breast Reduction    Social Hx: no smoking, ETOH or drugs     FAMILY HISTORY:  No pertinent family history in first degree relatives    Allergies  contrast dye (Rash)  sulfa drugs (Rash)    Antibiotics:  remdesivir  IVPB 200 milliGRAM(s) IV Intermittent every 24 hours    REVIEW OF SYSTEMS:  CONSTITUTIONAL:  No Fever or chills  HEENT:  No diplopia or blurred vision.  No sore throat or runny nose.  CARDIOVASCULAR:  No chest pain or SOB.  RESPIRATORY:  +cough, no shortness of breath  GASTROINTESTINAL:  +nausea, vomiting and mild diarrhea.  GENITOURINARY:  No dysuria, frequency or urgency. No Blood in urine  MUSCULOSKELETAL:  no joint aches, no muscle pain  SKIN:  No change in skin, hair or nails.  NEUROLOGIC:  No paresthesias, fasciculations, seizures or weakness.  PSYCHIATRIC:  No disorder of thought or mood.  ENDOCRINE:  No heat or cold intolerance, polyuria or polydipsia.  HEMATOLOGICAL:  No easy bruising or bleeding.     Physical Exam:  Vital Signs Last 24 Hrs  T(C): 37.2 (18 Feb 2021 16:32), Max: 37.2 (18 Feb 2021 05:29)  T(F): 98.9 (18 Feb 2021 16:32), Max: 98.9 (18 Feb 2021 05:29)  HR: 90 (18 Feb 2021 16:32) (71 - 106)  BP: 125/76 (18 Feb 2021 16:32) (124/76 - 149/88)  BP(mean): --  RR: 18 (18 Feb 2021 16:32) (16 - 18)  SpO2: 94% (18 Feb 2021 16:32) (91% - 94%)  GEN: NAD  HEENT: normocephalic and atraumatic. EOMI. PERRL.    NECK: Supple.  No lymphadenopathy   LUNGS: Clear to auscultation.  HEART: Regular rate and rhythm   ABDOMEN: Soft, nontender, and nondistended.  Positive bowel sounds.    EXTREMITIES: Without edema.  NEUROLOGIC: grossly intact.  PSYCHIATRIC: Appropriate affect .  SKIN: No rash      Labs:                        12.2   10.52 )-----------( 272      ( 18 Feb 2021 07:28 )             38.1     02-18    146<H>  |  110<H>  |  14  ----------------------------<  111<H>  3.8   |  25  |  0.73    Ca    9.3      18 Feb 2021 07:28    TPro  7.5  /  Alb  2.8<L>  /  TBili  0.3  /  DBili  .10  /  AST  20  /  ALT  30  /  AlkPhos  86  02-18    WBC Count: 10.52 K/uL (02-18-21 @ 07:28)  WBC Count: 8.09 K/uL (02-17-21 @ 05:56)    Creatinine, Serum: 0.73 mg/dL (02-18-21 @ 07:28)  Creatinine, Serum: 0.87 mg/dL (02-17-21 @ 16:50)  Creatinine, Serum: 0.81 mg/dL (02-17-21 @ 05:56)    C-Reactive Protein, Serum: 5.60 mg/dL (02-17-21 @ 11:05)    Ferritin, Serum: 233 ng/mL (02-17-21 @ 10:31)    Procalcitonin, Serum: <0.05 ng/mL (02-17-21 @ 05:56)     COVID-19 IgG Antibody Index: 6.19 Index (02-17-21 @ 22:31)  COVID-19 IgG Antibody Interpretation: Positive (02-17-21 @ 22:31)  COVID-19 PCR: Detected (02-17-21 @ 06:14)    All imaging and other data have been reviewed.  CT chest and abdomen 2/17 shows: Patchy ground-glass opacifications in both lungs. Atypical viral pneumonia such as Covid cannot be excluded.  Small gallstone in the gallbladder. Mild dependent density in the gallbladder may represent sludge. No evidence for thickened gallbladder wall or pericholecystic fluid.  The appendix appears normal. Colonic diverticulosis without evidence for diverticulitis. No bowel obstruction, or grossly thickened bowel wall.     Assessment and Plan:   56 yo woman with PMH of renal stone, HDL and hyperparathyroidism was admitted today with nausea and vomiting. She was diagnosed with COVID-19 on 2/10.     Treatment usually is different case by case, data suggest that these might work:   Remdisivir:  5 day course,  eGFR > 30 mL/min , ALT < 5X ULN  Steroid: For hypoxic patients on supplemental O2 of intubated. dexamethasone 6mg PO or IV Q-day x 10 days (equivalent to solumedrol 32mg IV or Prednisone 40mg)  Anticoagulation:  with prophylactic dosing, full dose to be considered in patients with increased risk for thromboembolic complications. Bleeding can happen but acceptable in high risk patients due to hypercoagulable state.  LMWH is good, for high risk patients consider discharge on oral anticoagulation with rivaroxaban (Xarelto) 10mg PO QD or Eliquis (apixaban) 2.5-5mg PO BID  x 4 weeks.  Currently data and recommendations for COVID-19 treatment are rapidly changing, so this treatment plan is based on my clinical judgement with available information.     COVID 19   - BMP, CBC w diff, NLR. Procalcitonin, Ferritin, CRP, LDH and D dimer for the start and periodically can be repeated.   - CXR with bilateral opacities more consistent with viral pneumonia   - continue Remdesivir to complete total 5days  - Hold on Dexamethasone unless develops hypoxia and needs supplemental O2, then start 6mg po daily for 10days  - Follow Creat and LFTs daily while on Remdesivir.    - Watch O2 sat closely   - No antibiotics at this time. PCT low   - Prophylactic anticoagulation due to hypercoagulable state   - On multiple antiemetics     Will follow.     Dr. Livia Green   Infectious Diseases   Please call [phone] between 8am and 6pm, call [phone] after 6pm or weekends.   
[facility]  INFECTIOUS DISEASES   =======================================================    [id number]  MYRANDA SNYDER     Follow up: COVID 19    No more vomiting and diarrhea.   Now on regular diet, tolerating well.   On o2 2L with NC with Spo2 in high 90s.     PAST MEDICAL & SURGICAL HISTORY:  Hyperlipidemia  Cervicogenic Migraine  Herniated Disc  Renal Calculus or Stone  Hyperparathyroidism  Urethral Stone  s/p urethral stents, multiple  Status Post LASIK Surgery  Breast Reduction    Social Hx: no smoking, ETOH or drugs     FAMILY HISTORY:  No pertinent family history in first degree relatives    Allergies  contrast dye (Rash)  sulfa drugs (Rash)    Antibiotics:  remdesivir  IVPB 200 milliGRAM(s) IV Intermittent every 24 hours    REVIEW OF SYSTEMS:  CONSTITUTIONAL:  No Fever or chills  HEENT:  No diplopia or blurred vision.  No sore throat or runny nose.  CARDIOVASCULAR:  No chest pain or SOB.  RESPIRATORY:  +cough, no shortness of breath  GASTROINTESTINAL:  +nausea, vomiting and mild diarrhea.  GENITOURINARY:  No dysuria, frequency or urgency. No Blood in urine  MUSCULOSKELETAL:  no joint aches, no muscle pain  SKIN:  No change in skin, hair or nails.  NEUROLOGIC:  No paresthesias, fasciculations, seizures or weakness.  PSYCHIATRIC:  No disorder of thought or mood.  ENDOCRINE:  No heat or cold intolerance, polyuria or polydipsia.  HEMATOLOGICAL:  No easy bruising or bleeding.     Physical Exam:  Vital Signs Last 24 Hrs  T(C): 36.8 (21 Feb 2021 04:41), Max: 36.8 (20 Feb 2021 16:08)  T(F): 98.3 (21 Feb 2021 04:41), Max: 98.3 (20 Feb 2021 16:08)  HR: 69 (21 Feb 2021 04:41) (69 - 77)  BP: 142/84 (21 Feb 2021 04:41) (129/79 - 142/84)  BP(mean): --  RR: 30 (21 Feb 2021 04:41) (22 - 30)  SpO2: 90% (21 Feb 2021 09:00) (85% - 95%)  GEN: NAD  HEENT: normocephalic and atraumatic. EOMI. PERRL.    NECK: Supple.  No lymphadenopathy   LUNGS: Clear to auscultation.  HEART: Regular rate and rhythm   ABDOMEN: Soft, nontender, and nondistended.  Positive bowel sounds.    EXTREMITIES: Without edema.  NEUROLOGIC: grossly intact.  PSYCHIATRIC: Appropriate affect .  SKIN: No rash    Labs:                        11.9   7.10  )-----------( 380      ( 21 Feb 2021 06:46 )             37.0     02-21    147<H>  |  113<H>  |  18  ----------------------------<  122<H>  3.7   |  26  |  0.62    Ca    8.6      21 Feb 2021 06:46    TPro  6.5  /  Alb  2.5<L>  /  TBili  0.3  /  DBili  <.10  /  AST  9<L>  /  ALT  23  /  AlkPhos  65  02-21    WBC Count: 7.10 K/uL (02-21-21 @ 06:46)  WBC Count: 4.18 K/uL (02-20-21 @ 06:53)  WBC Count: 6.28 K/uL (02-19-21 @ 06:29)  WBC Count: 10.52 K/uL (02-18-21 @ 07:28)  WBC Count: 8.09 K/uL (02-17-21 @ 05:56)    Creatinine, Serum: 0.62 mg/dL (02-21-21 @ 06:46)  Creatinine, Serum: 0.58 mg/dL (02-20-21 @ 06:53)  Creatinine, Serum: 0.62 mg/dL (02-19-21 @ 06:29)  Creatinine, Serum: 0.73 mg/dL (02-18-21 @ 07:28)  Creatinine, Serum: 0.87 mg/dL (02-17-21 @ 16:50)  Creatinine, Serum: 0.81 mg/dL (02-17-21 @ 05:56)    C-Reactive Protein, Serum: 5.60 mg/dL (02-17-21 @ 11:05)    Ferritin, Serum: 233 ng/mL (02-17-21 @ 10:31)    Procalcitonin, Serum: <0.05 ng/mL (02-17-21 @ 05:56)     COVID-19 IgG Antibody Index: 6.19 Index (02-17-21 @ 22:31)  COVID-19 IgG Antibody Interpretation: Positive (02-17-21 @ 22:31)  COVID-19 PCR: Detected (02-17-21 @ 06:14)    All imaging and other data have been reviewed.  CT chest and abdomen 2/17 shows: Patchy ground-glass opacifications in both lungs. Atypical viral pneumonia such as Covid cannot be excluded.  Small gallstone in the gallbladder. Mild dependent density in the gallbladder may represent sludge. No evidence for thickened gallbladder wall or pericholecystic fluid.  The appendix appears normal. Colonic diverticulosis without evidence for diverticulitis. No bowel obstruction, or grossly thickened bowel wall.     Assessment and Plan:   54 yo woman with PMH of renal stone, HDL and hyperparathyroidism was admitted today with nausea and vomiting. She was diagnosed with COVID-19 on 2/10.     Treatment usually is different case by case, data suggest that these might work:   Remdisivir:  5 day course,  eGFR > 30 mL/min , ALT < 5X ULN  Steroid: For hypoxic patients on supplemental O2 of intubated. dexamethasone 6mg PO or IV Q-day x 10 days (equivalent to solumedrol 32mg IV or Prednisone 40mg)  Anticoagulation:  with prophylactic dosing, full dose to be considered in patients with increased risk for thromboembolic complications. Bleeding can happen but acceptable in high risk patients due to hypercoagulable state.  LMWH is good, for high risk patients consider discharge on oral anticoagulation with rivaroxaban (Xarelto) 10mg PO QD or Eliquis (apixaban) 2.5-5mg PO BID  x 4 weeks.  Currently data and recommendations for COVID-19 treatment are rapidly changing, so this treatment plan is based on my clinical judgement with available information.     COVID 19   - BMP, CBC w diff, NLR. Procalcitonin, Ferritin, CRP, LDH and D dimer for the start and periodically can be repeated.   - CXR with bilateral opacities more consistent with viral pneumonia   - completed Remdesivir today for 5days.   - on Dexamethasone 6mg po daily can complete 10days  - Watch O2 sat closely   - No antibiotics at this time. PCT low   - Prophylactic anticoagulation due to hypercoagulable state   - On antiemetics tolerating regular diet     Will sign off please call with any question.     Dr. Livia Green   Infectious Diseases   Please call [phone] between 8am and 6pm, call [phone] after 6pm or weekends.       
[facility]  INFECTIOUS DISEASES   =======================================================    [id number]  MYRANDA SNYDER     Follow up: COVID 19    No more vomiting and diarrhea.   Now on regular diet. feels better.   On o2 with NC with Spo2 95-96%.     PAST MEDICAL & SURGICAL HISTORY:  Hyperlipidemia  Cervicogenic Migraine  Herniated Disc  Renal Calculus or Stone  Hyperparathyroidism  Urethral Stone  s/p urethral stents, multiple  Status Post LASIK Surgery  Breast Reduction    Social Hx: no smoking, ETOH or drugs     FAMILY HISTORY:  No pertinent family history in first degree relatives    Allergies  contrast dye (Rash)  sulfa drugs (Rash)    Antibiotics:  remdesivir  IVPB 200 milliGRAM(s) IV Intermittent every 24 hours    REVIEW OF SYSTEMS:  CONSTITUTIONAL:  No Fever or chills  HEENT:  No diplopia or blurred vision.  No sore throat or runny nose.  CARDIOVASCULAR:  No chest pain or SOB.  RESPIRATORY:  +cough, no shortness of breath  GASTROINTESTINAL:  +nausea, vomiting and mild diarrhea.  GENITOURINARY:  No dysuria, frequency or urgency. No Blood in urine  MUSCULOSKELETAL:  no joint aches, no muscle pain  SKIN:  No change in skin, hair or nails.  NEUROLOGIC:  No paresthesias, fasciculations, seizures or weakness.  PSYCHIATRIC:  No disorder of thought or mood.  ENDOCRINE:  No heat or cold intolerance, polyuria or polydipsia.  HEMATOLOGICAL:  No easy bruising or bleeding.     Physical Exam:  Vital Signs Last 24 Hrs  T(C): 36.4 (20 Feb 2021 04:53), Max: 36.7 (19 Feb 2021 18:43)  T(F): 97.5 (20 Feb 2021 04:53), Max: 98 (19 Feb 2021 18:43)  HR: 66 (20 Feb 2021 04:53) (66 - 82)  BP: 153/94 (20 Feb 2021 04:53) (146/77 - 153/94)  BP(mean): --  RR: 18 (20 Feb 2021 04:53) (18 - 18)  SpO2: 92% (20 Feb 2021 04:53) (92% - 93%)  GEN: NAD  HEENT: normocephalic and atraumatic. EOMI. PERRL.    NECK: Supple.  No lymphadenopathy   LUNGS: Clear to auscultation.  HEART: Regular rate and rhythm   ABDOMEN: Soft, nontender, and nondistended.  Positive bowel sounds.    EXTREMITIES: Without edema.  NEUROLOGIC: grossly intact.  PSYCHIATRIC: Appropriate affect .  SKIN: No rash    Labs:                        12.3   4.18  )-----------( 332      ( 20 Feb 2021 06:53 )             37.9     02-20    144  |  111<H>  |  12  ----------------------------<  176<H>  4.1   |  26  |  0.58    Ca    9.2      20 Feb 2021 06:53    TPro  7.0  /  Alb  2.6<L>  /  TBili  0.4  /  DBili  .10  /  AST  10<L>  /  ALT  24  /  AlkPhos  74  02-20    WBC Count: 4.18 K/uL (02-20-21 @ 06:53)  WBC Count: 6.28 K/uL (02-19-21 @ 06:29)  WBC Count: 10.52 K/uL (02-18-21 @ 07:28)  WBC Count: 8.09 K/uL (02-17-21 @ 05:56)    Creatinine, Serum: 0.58 mg/dL (02-20-21 @ 06:53)  Creatinine, Serum: 0.62 mg/dL (02-19-21 @ 06:29)  Creatinine, Serum: 0.73 mg/dL (02-18-21 @ 07:28)  Creatinine, Serum: 0.87 mg/dL (02-17-21 @ 16:50)  Creatinine, Serum: 0.81 mg/dL (02-17-21 @ 05:56)    C-Reactive Protein, Serum: 5.60 mg/dL (02-17-21 @ 11:05)    Ferritin, Serum: 233 ng/mL (02-17-21 @ 10:31)    Procalcitonin, Serum: <0.05 ng/mL (02-17-21 @ 05:56)     COVID-19 IgG Antibody Index: 6.19 Index (02-17-21 @ 22:31)  COVID-19 IgG Antibody Interpretation: Positive (02-17-21 @ 22:31)  COVID-19 PCR: Detected (02-17-21 @ 06:14)    All imaging and other data have been reviewed.  CT chest and abdomen 2/17 shows: Patchy ground-glass opacifications in both lungs. Atypical viral pneumonia such as Covid cannot be excluded.  Small gallstone in the gallbladder. Mild dependent density in the gallbladder may represent sludge. No evidence for thickened gallbladder wall or pericholecystic fluid.  The appendix appears normal. Colonic diverticulosis without evidence for diverticulitis. No bowel obstruction, or grossly thickened bowel wall.     Assessment and Plan:   54 yo woman with PMH of renal stone, HDL and hyperparathyroidism was admitted today with nausea and vomiting. She was diagnosed with COVID-19 on 2/10.     Treatment usually is different case by case, data suggest that these might work:   Remdisivir:  5 day course,  eGFR > 30 mL/min , ALT < 5X ULN  Steroid: For hypoxic patients on supplemental O2 of intubated. dexamethasone 6mg PO or IV Q-day x 10 days (equivalent to solumedrol 32mg IV or Prednisone 40mg)  Anticoagulation:  with prophylactic dosing, full dose to be considered in patients with increased risk for thromboembolic complications. Bleeding can happen but acceptable in high risk patients due to hypercoagulable state.  LMWH is good, for high risk patients consider discharge on oral anticoagulation with rivaroxaban (Xarelto) 10mg PO QD or Eliquis (apixaban) 2.5-5mg PO BID  x 4 weeks.  Currently data and recommendations for COVID-19 treatment are rapidly changing, so this treatment plan is based on my clinical judgement with available information.     COVID 19   - BMP, CBC w diff, NLR. Procalcitonin, Ferritin, CRP, LDH and D dimer for the start and periodically can be repeated.   - CXR with bilateral opacities more consistent with viral pneumonia   - continue Remdesivir to complete total 5days, today day 4  - Has been started on Dexamethasone 6mg po daily can complete 10days  - Follow Creat and LFTs daily while on Remdesivir.    - Watch O2 sat closely   - No antibiotics at this time. PCT low   - Prophylactic anticoagulation due to hypercoagulable state   - On antiemetics tolerating regular diet now.      Will follow PRN.     Dr. Livia Green   Infectious Diseases   Please call [phone] between 8am and 6pm, call [phone] after 6pm or weekends.     
[facility]  INFECTIOUS DISEASES   =======================================================    [id number]  MYRANDA SNYDER     Follow up: COVID 19    Vomiting is improving, had once this morning, had once diarrhea as well.   Able to tolerate clear liquid. No fever. On o2 with NC with Spo2 95-96%.     PAST MEDICAL & SURGICAL HISTORY:  Hyperlipidemia  Cervicogenic Migraine  Herniated Disc  Renal Calculus or Stone  Hyperparathyroidism  Urethral Stone  s/p urethral stents, multiple  Status Post LASIK Surgery  Breast Reduction    Social Hx: no smoking, ETOH or drugs     FAMILY HISTORY:  No pertinent family history in first degree relatives    Allergies  contrast dye (Rash)  sulfa drugs (Rash)    Antibiotics:  remdesivir  IVPB 200 milliGRAM(s) IV Intermittent every 24 hours    REVIEW OF SYSTEMS:  CONSTITUTIONAL:  No Fever or chills  HEENT:  No diplopia or blurred vision.  No sore throat or runny nose.  CARDIOVASCULAR:  No chest pain or SOB.  RESPIRATORY:  +cough, no shortness of breath  GASTROINTESTINAL:  +nausea, vomiting and mild diarrhea.  GENITOURINARY:  No dysuria, frequency or urgency. No Blood in urine  MUSCULOSKELETAL:  no joint aches, no muscle pain  SKIN:  No change in skin, hair or nails.  NEUROLOGIC:  No paresthesias, fasciculations, seizures or weakness.  PSYCHIATRIC:  No disorder of thought or mood.  ENDOCRINE:  No heat or cold intolerance, polyuria or polydipsia.  HEMATOLOGICAL:  No easy bruising or bleeding.     Physical Exam:  Vital Signs Last 24 Hrs  T(C): 36.7 (19 Feb 2021 08:39), Max: 37.2 (18 Feb 2021 16:32)  T(F): 98.1 (19 Feb 2021 08:39), Max: 98.9 (18 Feb 2021 16:32)  HR: 92 (19 Feb 2021 08:39) (86 - 92)  BP: 152/80 (19 Feb 2021 08:39) (125/76 - 152/80)  BP(mean): --  RR: 18 (19 Feb 2021 08:39) (18 - 18)  SpO2: 96% (19 Feb 2021 08:39) (93% - 96%)  GEN: NAD  HEENT: normocephalic and atraumatic. EOMI. PERRL.    NECK: Supple.  No lymphadenopathy   LUNGS: Clear to auscultation.  HEART: Regular rate and rhythm   ABDOMEN: Soft, nontender, and nondistended.  Positive bowel sounds.    EXTREMITIES: Without edema.  NEUROLOGIC: grossly intact.  PSYCHIATRIC: Appropriate affect .  SKIN: No rash    Labs:             11.5   6.28  )-----------( 281      ( 19 Feb 2021 06:29 )             36.1     02-19    148<H>  |  115<H>  |  19  ----------------------------<  84  3.4<L>   |  23  |  0.62    Ca    8.5      19 Feb 2021 06:29    TPro  6.7  /  Alb  2.5<L>  /  TBili  0.3  /  DBili  <.10  /  AST  18  /  ALT  26  /  AlkPhos  71  02-19    WBC Count: 6.28 K/uL (02-19-21 @ 06:29)  WBC Count: 10.52 K/uL (02-18-21 @ 07:28)  WBC Count: 8.09 K/uL (02-17-21 @ 05:56)    Creatinine, Serum: 0.62 mg/dL (02-19-21 @ 06:29)  Creatinine, Serum: 0.73 mg/dL (02-18-21 @ 07:28)  Creatinine, Serum: 0.87 mg/dL (02-17-21 @ 16:50)  Creatinine, Serum: 0.81 mg/dL (02-17-21 @ 05:56)    C-Reactive Protein, Serum: 5.60 mg/dL (02-17-21 @ 11:05)    Ferritin, Serum: 233 ng/mL (02-17-21 @ 10:31)    Procalcitonin, Serum: <0.05 ng/mL (02-17-21 @ 05:56)    COVID-19 IgG Antibody Index: 6.19 Index (02-17-21 @ 22:31)  COVID-19 IgG Antibody Interpretation: Positive (02-17-21 @ 22:31)  COVID-19 PCR: Detected (02-17-21 @ 06:14)    All imaging and other data have been reviewed.  CT chest and abdomen 2/17 shows: Patchy ground-glass opacifications in both lungs. Atypical viral pneumonia such as Covid cannot be excluded.  Small gallstone in the gallbladder. Mild dependent density in the gallbladder may represent sludge. No evidence for thickened gallbladder wall or pericholecystic fluid.  The appendix appears normal. Colonic diverticulosis without evidence for diverticulitis. No bowel obstruction, or grossly thickened bowel wall.     Assessment and Plan:   56 yo woman with PMH of renal stone, HDL and hyperparathyroidism was admitted today with nausea and vomiting. She was diagnosed with COVID-19 on 2/10.     Treatment usually is different case by case, data suggest that these might work:   Remdisivir:  5 day course,  eGFR > 30 mL/min , ALT < 5X ULN  Steroid: For hypoxic patients on supplemental O2 of intubated. dexamethasone 6mg PO or IV Q-day x 10 days (equivalent to solumedrol 32mg IV or Prednisone 40mg)  Anticoagulation:  with prophylactic dosing, full dose to be considered in patients with increased risk for thromboembolic complications. Bleeding can happen but acceptable in high risk patients due to hypercoagulable state.  LMWH is good, for high risk patients consider discharge on oral anticoagulation with rivaroxaban (Xarelto) 10mg PO QD or Eliquis (apixaban) 2.5-5mg PO BID  x 4 weeks.  Currently data and recommendations for COVID-19 treatment are rapidly changing, so this treatment plan is based on my clinical judgement with available information.     COVID 19   - BMP, CBC w diff, NLR. Procalcitonin, Ferritin, CRP, LDH and D dimer for the start and periodically can be repeated.   - CXR with bilateral opacities more consistent with viral pneumonia   - continue Remdesivir to complete total 5days, today day 3  - Has been started on Dexamethasone 6mg po daily can complete 10days  - Follow Creat and LFTs daily while on Remdesivir.    - Watch O2 sat closely   - No antibiotics at this time. PCT low   - Prophylactic anticoagulation due to hypercoagulable state   - On multiple antiemetics     Will follow PRN.     Dr. Livia Green   Infectious Diseases   Please call [phone] between 8am and 6pm, call [phone] after 6pm or weekends.   
Patient is a 55y old  Female who presents with a chief complaint of COVID-19, intractable nausea and vomiting (17 Feb 2021 17:32)    ----  INTERVAL HPI/OVERNIGHT EVENTS: Pt seen and evaluated at the bedside. No acute overnight events occurred. c/o nausea, vomiting and diarrhea, no sob or cp    ----  PAST MEDICAL & SURGICAL HISTORY:  Hyperlipidemia    Cervicogenic Migraine    Herniated Disc    Renal Calculus or Stone    Hyperparathyroidism    Urethral Stone  s/p urethral stents, multiple    Status Post LASIK Surgery    Breast Reduction        FAMILY HISTORY:  No pertinent family history in first degree relatives         Allergies    contrast dye (Rash)  sulfa drugs (Rash)    Intolerances        ----  MEDICATIONS  (STANDING):  enoxaparin Injectable 40 milliGRAM(s) SubCutaneous every 12 hours  promethazine IVPB 25 milliGRAM(s) IV Intermittent once  remdesivir  IVPB 100 milliGRAM(s) IV Intermittent every 24 hours  remdesivir  IVPB   IV Intermittent   sodium chloride 0.9%. 1000 milliLiter(s) (100 mL/Hr) IV Continuous <Continuous>    MEDICATIONS  (PRN):  acetaminophen   Tablet .. 650 milliGRAM(s) Oral every 6 hours PRN Temp greater or equal to 38C (100.4F), Mild Pain (1 - 3)  ondansetron Injectable 4 milliGRAM(s) IV Push three times a day PRN Nausea and/or Vomiting      ----  REVIEW OF SYSTEMS:  CONSTITUTIONAL: denies fever, chills, fatigue, weakness  HEENT: denies blurred vision, sore throat  SKIN: denies new lesions, rash  CARDIOVASCULAR: denies chest pain, chest pressure, palpitations  RESPIRATORY: denies shortness of breath, sputum production  GASTROINTESTINAL: (+) nausea, vomiting and diarrhea, no abdominal pain  GENITOURINARY: denies dysuria, discharge  NEUROLOGICAL: denies numbness, headache, focal weakness  MUSCULOSKELETAL: denies new joint pain, muscle aches  HEMATOLOGIC: denies gross bleeding, bruising      ----  PHYSICAL EXAM:  GENERAL:  no acute distress, appropriately interactive, not feeling well  EYES: sclera clear, no exudates  ENMT: dry mucous membranes  NECK: supple, soft, no thyromegaly noted  LUNGS: decrease breath sounds, no wheezing or rhonchi appreciated  HEART: soft S1/S2, regular rate and rhythm, no murmurs noted, no noted edema to b/l LE  GASTROINTESTINAL: abdomen is soft, epigastric tenderness, nondistended, normoactive bowel sounds, no palpable masses  INTEGUMENT: no visualized rashes, no jaundice noted  MUSCULOSKELETAL: no clubbing or cyanosis, no obvious deformity  NEUROLOGIC: awake, alert, oriented x3, good muscle tone in 4 extremities, no obvious sensory deficits  PSYCHIATRIC: mood is good, affect is congruent with mood, linear and logical thought process  HEME/LYMPH: no palpable supraclavicular nodules, no obvious ecchymosis     T(C): 36.9 (02-18-21 @ 11:03), Max: 37.2 (02-18-21 @ 05:29)  HR: 96 (02-18-21 @ 11:03) (71 - 106)  BP: 137/83 (02-18-21 @ 11:03) (124/76 - 149/88)  RR: 18 (02-18-21 @ 11:03) (16 - 18)  SpO2: 92% (02-18-21 @ 11:03) (91% - 95%)  Wt(kg): --    ----  I&O's Summary    17 Feb 2021 07:01  -  18 Feb 2021 07:00  --------------------------------------------------------  IN: 350 mL / OUT: 0 mL / NET: 350 mL        LABS:                        12.2   10.52 )-----------( 272      ( 18 Feb 2021 07:28 )             38.1     02-18    146<H>  |  110<H>  |  14  ----------------------------<  111<H>  3.8   |  25  |  0.73    Ca    9.3      18 Feb 2021 07:28    TPro  7.5  /  Alb  2.8<L>  /  TBili  0.3  /  DBili  .10  /  AST  20  /  ALT  30  /  AlkPhos  86  02-18        CAPILLARY BLOOD GLUCOSE                        
Patient is a 55y old  Female who presents with a chief complaint of COVID-19, intractable nausea and vomiting (17 Feb 2021 17:32)    ----  INTERVAL HPI/OVERNIGHT EVENTS: Pt seen and evaluated at the bedside. No acute overnight events occurred. still c/o nausea and diarrhea, no vomiting, no sob or cp    ----  PAST MEDICAL & SURGICAL HISTORY:  Hyperlipidemia    Cervicogenic Migraine    Herniated Disc    Renal Calculus or Stone    Hyperparathyroidism    Urethral Stone  s/p urethral stents, multiple    Status Post LASIK Surgery    Breast Reduction        FAMILY HISTORY:  No pertinent family history in first degree relatives         Allergies    contrast dye (Rash)  sulfa drugs (Rash)    Intolerances  ----  MEDICATIONS  (STANDING):  dexAMETHasone  Injectable 6 milliGRAM(s) IV Push daily  dextrose 5% + sodium chloride 0.45% 1000 milliLiter(s) (75 mL/Hr) IV Continuous <Continuous>  enoxaparin Injectable 40 milliGRAM(s) SubCutaneous every 12 hours  remdesivir  IVPB 100 milliGRAM(s) IV Intermittent every 24 hours  remdesivir  IVPB   IV Intermittent     MEDICATIONS  (PRN):  acetaminophen   Tablet .. 650 milliGRAM(s) Oral every 6 hours PRN Temp greater or equal to 38C (100.4F), Mild Pain (1 - 3)  ondansetron Injectable 4 milliGRAM(s) IV Push three times a day PRN Nausea and/or Vomiting      ----  REVIEW OF SYSTEMS:  CONSTITUTIONAL: denies fever, chills, fatigue, weakness  HEENT: denies blurred vision, sore throat  SKIN: denies new lesions, rash  CARDIOVASCULAR: denies chest pain, chest pressure, palpitations  RESPIRATORY: denies shortness of breath, sputum production  GASTROINTESTINAL: (+) nausea, no vomiting, (+) diarrhea, no abdominal pain  GENITOURINARY: denies dysuria, discharge  NEUROLOGICAL: denies numbness, headache, focal weakness  MUSCULOSKELETAL: denies new joint pain, muscle aches  HEMATOLOGIC: denies gross bleeding, bruising    ----  PHYSICAL EXAM:  GENERAL:  no acute distress, appropriately interactive  EYES: sclera clear, no exudates  ENMT: dry mucous membranes  NECK: supple, soft, no thyromegaly noted  LUNGS: decrease breath sounds, no wheezing or rhonchi appreciated  HEART: soft S1/S2, regular rate and rhythm, no murmurs noted, no noted edema to b/l LE  GASTROINTESTINAL: abdomen is soft, no tenderness, nondistended, normoactive bowel sounds, no palpable masses  INTEGUMENT: no visualized rashes, no jaundice noted  MUSCULOSKELETAL: no clubbing or cyanosis, no obvious deformity  NEUROLOGIC: awake, alert, oriented x3, good muscle tone in 4 extremities, no obvious sensory deficits  PSYCHIATRIC: mood is good, affect is congruent with mood, linear and logical thought process  HEME/LYMPH: no palpable supraclavicular nodules, no obvious ecchymosis     Vital Signs Last 24 Hrs  T(C): 36.7 (19 Feb 2021 08:39), Max: 37.2 (18 Feb 2021 16:32)  T(F): 98.1 (19 Feb 2021 08:39), Max: 98.9 (18 Feb 2021 16:32)  HR: 92 (19 Feb 2021 08:39) (86 - 92)  BP: 152/80 (19 Feb 2021 08:39) (125/76 - 152/80)  BP(mean): --  RR: 18 (19 Feb 2021 08:39) (18 - 18)  SpO2: 96% (19 Feb 2021 08:39) (93% - 96%)    ----  I&O's Summary    18 Feb 2021 07:01  -  19 Feb 2021 07:00  --------------------------------------------------------  IN: 1300 mL / OUT: 0 mL / NET: 1300 mL    19 Feb 2021 07:01  -  19 Feb 2021 14:14  --------------------------------------------------------  IN: 300 mL / OUT: 150 mL / NET: 150 mL      17 Feb 2021 07:01  -  18 Feb 2021 07:00  --------------------------------------------------------  IN: 350 mL / OUT: 0 mL / NET: 350 mL        LABS:                        11.5   6.28  )-----------( 281      ( 19 Feb 2021 06:29 )             36.1                         12.2   10.52 )-----------( 272      ( 18 Feb 2021 07:28 )             38.1     02-19    148<H>  |  115<H>  |  19  ----------------------------<  84  3.4<L>   |  23  |  0.62    Ca    8.5      19 Feb 2021 06:29    TPro  6.7  /  Alb  2.5<L>  /  TBili  0.3  /  DBili  <.10  /  AST  18  /  ALT  26  /  AlkPhos  71  02-19    02-18    146<H>  |  110<H>  |  14  ----------------------------<  111<H>  3.8   |  25  |  0.73    Ca    9.3      18 Feb 2021 07:28    TPro  7.5  /  Alb  2.8<L>  /  TBili  0.3  /  DBili  .10  /  AST  20  /  ALT  30  /  AlkPhos  86  02-18        CAPILLARY BLOOD GLUCOSE                        
Patient is a 55y old  Female who presents with a chief complaint of COVID-19, intractable nausea and vomiting (2021 11:32)  ----  INTERVAL HPI/OVERNIGHT EVENTS: Pt seen and evaluated at the bedside. No acute overnight events occurred. patient reports feeling better, no vomiting overnight, no diarrhea overnight.     ----  PAST MEDICAL & SURGICAL HISTORY:  Hyperlipidemia    Cervicogenic Migraine    Herniated Disc    Renal Calculus or Stone    Hyperparathyroidism    Urethral Stone  s/p urethral stents, multiple    Status Post LASIK Surgery    Breast Reduction        FAMILY HISTORY:  No pertinent family history in first degree relatives         Allergies    contrast dye (Rash)  sulfa drugs (Rash)    Intolerances        ----  MEDICATIONS  (STANDING):  dexAMETHasone  Injectable 6 milliGRAM(s) IV Push daily  enoxaparin Injectable 40 milliGRAM(s) SubCutaneous every 12 hours  remdesivir  IVPB 100 milliGRAM(s) IV Intermittent every 24 hours  remdesivir  IVPB   IV Intermittent     MEDICATIONS  (PRN):  acetaminophen   Tablet .. 650 milliGRAM(s) Oral every 6 hours PRN Temp greater or equal to 38C (100.4F), Mild Pain (1 - 3)  ondansetron Injectable 4 milliGRAM(s) IV Push three times a day PRN Nausea and/or Vomiting      ----  REVIEW OF SYSTEMS:  CONSTITUTIONAL: denies fever, chills, fatigue, weakness  HEENT: denies blurred vision, sore throat  SKIN: denies new lesions, rash  CARDIOVASCULAR: denies chest pain, chest pressure, palpitations  RESPIRATORY: denies shortness of breath, (+) cough  GASTROINTESTINAL: denies nausea, vomiting, diarrhea, abdominal pain  GENITOURINARY: denies dysuria, discharge  NEUROLOGICAL: denies numbness, headache, focal weakness  MUSCULOSKELETAL: denies new joint pain, muscle aches  ----  PHYSICAL EXAM:  GENERAL: patient appears well, no acute distress, appropriately interactive  EYES: sclera clear, no exudates  ENMT: oropharynx clear without erythema, moist mucous membranes  NECK: supple, soft, no thyromegaly noted  LUNGS: clear breath sounds, no wheezing or rhonchi appreciated  HEART: soft S1/S2, RRR, no murmurs  GASTROINTESTINAL: abdomen is soft, nontender, nondistended, normoactive bowel sounds, no palpable masses  INTEGUMENT: no visualized rashes, no jaundice noted  MUSCULOSKELETAL: no clubbing or cyanosis, no obvious deformity  NEUROLOGIC: awake, alert, oriented x3    T(C): 36.6 (21 @ 11:35), Max: 36.7 (21 @ 18:43)  HR: 74 (21 @ 11:35) (66 - 82)  BP: 142/81 (21 @ 11:35) (142/81 - 153/94)  RR: 26 (21 @ 11:35) (18 - 26)  SpO2: 92% (21 @ 11:35) (92% - 93%)  Wt(kg): --    ----  I&O's Summary    2021 07:01  -  2021 07:00  --------------------------------------------------------  IN: 1900 mL / OUT: 1075 mL / NET: 825 mL    2021 07:01  -  2021 12:25  --------------------------------------------------------  IN: 0 mL / OUT: 700 mL / NET: -700 mL        LABS:                        12.3   4.18  )-----------( 332      ( 2021 06:53 )             37.9     02-    144  |  111<H>  |  12  ----------------------------<  176<H>  4.1   |  26  |  0.58    Ca    9.2      2021 06:53    TPro  7.0  /  Alb  2.6<L>  /  TBili  0.4  /  DBili  .10  /  AST  10<L>  /  ALT  24  /  AlkPhos  74  02-20      Urinalysis Basic - ( 2021 22:40 )    Color: Yellow / Appearance: Slightly Turbid / S.015 / pH: x  Gluc: x / Ketone: Moderate  / Bili: Negative / Urobili: 1   Blood: x / Protein: 30 mg/dL / Nitrite: Negative   Leuk Esterase: Negative / RBC: 3-5 /HPF / WBC 0-2   Sq Epi: x / Non Sq Epi: Few / Bacteria: Few      CAPILLARY BLOOD GLUCOSE

## 2021-02-21 NOTE — DISCHARGE NOTE NURSING/CASE MANAGEMENT/SOCIAL WORK - NURSING SECTION COMPLETE
Patient/Caregiver provided printed discharge information.
Labile

## 2021-05-17 ENCOUNTER — INPATIENT (INPATIENT)
Facility: HOSPITAL | Age: 56
LOS: 0 days | Discharge: HOME | DRG: 645 | End: 2021-05-18
Attending: INTERNAL MEDICINE | Admitting: INTERNAL MEDICINE
Payer: COMMERCIAL

## 2021-05-17 VITALS — WEIGHT: 190.04 LBS | HEIGHT: 65 IN

## 2021-05-17 LAB
ALBUMIN SERPL ELPH-MCNC: 3.3 G/DL — SIGNIFICANT CHANGE UP (ref 3.3–5)
ALP SERPL-CCNC: 77 U/L — SIGNIFICANT CHANGE UP (ref 40–120)
ALT FLD-CCNC: 32 U/L — SIGNIFICANT CHANGE UP (ref 12–78)
ANION GAP SERPL CALC-SCNC: 4 MMOL/L — LOW (ref 5–17)
APTT BLD: 34.7 SEC — SIGNIFICANT CHANGE UP (ref 27.5–35.5)
AST SERPL-CCNC: 19 U/L — SIGNIFICANT CHANGE UP (ref 15–37)
BASOPHILS # BLD AUTO: 0.02 K/UL — SIGNIFICANT CHANGE UP (ref 0–0.2)
BASOPHILS NFR BLD AUTO: 0.4 % — SIGNIFICANT CHANGE UP (ref 0–2)
BILIRUB SERPL-MCNC: 0.2 MG/DL — SIGNIFICANT CHANGE UP (ref 0.2–1.2)
BUN SERPL-MCNC: 17 MG/DL — SIGNIFICANT CHANGE UP (ref 7–23)
CALCIUM SERPL-MCNC: 9.9 MG/DL — SIGNIFICANT CHANGE UP (ref 8.5–10.1)
CHLORIDE SERPL-SCNC: 110 MMOL/L — HIGH (ref 96–108)
CO2 SERPL-SCNC: 29 MMOL/L — SIGNIFICANT CHANGE UP (ref 22–31)
CREAT SERPL-MCNC: 0.61 MG/DL — SIGNIFICANT CHANGE UP (ref 0.5–1.3)
D DIMER BLD IA.RAPID-MCNC: 334 NG/ML DDU — HIGH
EOSINOPHIL # BLD AUTO: 0.06 K/UL — SIGNIFICANT CHANGE UP (ref 0–0.5)
EOSINOPHIL NFR BLD AUTO: 1.2 % — SIGNIFICANT CHANGE UP (ref 0–6)
GLUCOSE SERPL-MCNC: 88 MG/DL — SIGNIFICANT CHANGE UP (ref 70–99)
HCT VFR BLD CALC: 36 % — SIGNIFICANT CHANGE UP (ref 34.5–45)
HGB BLD-MCNC: 11.4 G/DL — LOW (ref 11.5–15.5)
IMM GRANULOCYTES NFR BLD AUTO: 0.2 % — SIGNIFICANT CHANGE UP (ref 0–1.5)
INR BLD: 1.06 RATIO — SIGNIFICANT CHANGE UP (ref 0.88–1.16)
LIDOCAIN IGE QN: 149 U/L — SIGNIFICANT CHANGE UP (ref 73–393)
LYMPHOCYTES # BLD AUTO: 1.6 K/UL — SIGNIFICANT CHANGE UP (ref 1–3.3)
LYMPHOCYTES # BLD AUTO: 30.9 % — SIGNIFICANT CHANGE UP (ref 13–44)
MAGNESIUM SERPL-MCNC: 1.8 MG/DL — SIGNIFICANT CHANGE UP (ref 1.6–2.6)
MCHC RBC-ENTMCNC: 26.9 PG — LOW (ref 27–34)
MCHC RBC-ENTMCNC: 31.7 GM/DL — LOW (ref 32–36)
MCV RBC AUTO: 84.9 FL — SIGNIFICANT CHANGE UP (ref 80–100)
MONOCYTES # BLD AUTO: 0.84 K/UL — SIGNIFICANT CHANGE UP (ref 0–0.9)
MONOCYTES NFR BLD AUTO: 16.2 % — HIGH (ref 2–14)
NEUTROPHILS # BLD AUTO: 2.65 K/UL — SIGNIFICANT CHANGE UP (ref 1.8–7.4)
NEUTROPHILS NFR BLD AUTO: 51.1 % — SIGNIFICANT CHANGE UP (ref 43–77)
NT-PROBNP SERPL-SCNC: 165 PG/ML — HIGH (ref 0–125)
PLATELET # BLD AUTO: 293 K/UL — SIGNIFICANT CHANGE UP (ref 150–400)
POTASSIUM SERPL-MCNC: 3.9 MMOL/L — SIGNIFICANT CHANGE UP (ref 3.5–5.3)
POTASSIUM SERPL-SCNC: 3.9 MMOL/L — SIGNIFICANT CHANGE UP (ref 3.5–5.3)
PROT SERPL-MCNC: 7 GM/DL — SIGNIFICANT CHANGE UP (ref 6–8.3)
PROTHROM AB SERPL-ACNC: 12.4 SEC — SIGNIFICANT CHANGE UP (ref 10.6–13.6)
RAPID RVP RESULT: SIGNIFICANT CHANGE UP
RBC # BLD: 4.24 M/UL — SIGNIFICANT CHANGE UP (ref 3.8–5.2)
RBC # FLD: 12.4 % — SIGNIFICANT CHANGE UP (ref 10.3–14.5)
SARS-COV-2 RNA SPEC QL NAA+PROBE: SIGNIFICANT CHANGE UP
SODIUM SERPL-SCNC: 143 MMOL/L — SIGNIFICANT CHANGE UP (ref 135–145)
TROPONIN I SERPL-MCNC: <0.015 NG/ML — SIGNIFICANT CHANGE UP (ref 0.01–0.04)
WBC # BLD: 5.18 K/UL — SIGNIFICANT CHANGE UP (ref 3.8–10.5)
WBC # FLD AUTO: 5.18 K/UL — SIGNIFICANT CHANGE UP (ref 3.8–10.5)

## 2021-05-17 PROCEDURE — 93010 ELECTROCARDIOGRAM REPORT: CPT

## 2021-05-17 PROCEDURE — 99285 EMERGENCY DEPT VISIT HI MDM: CPT

## 2021-05-17 PROCEDURE — 71045 X-RAY EXAM CHEST 1 VIEW: CPT | Mod: 26

## 2021-05-17 RX ORDER — DIPHENHYDRAMINE HCL 50 MG
50 CAPSULE ORAL ONCE
Refills: 0 | Status: COMPLETED | OUTPATIENT
Start: 2021-05-17 | End: 2021-05-17

## 2021-05-17 RX ADMIN — Medication 50 MILLIGRAM(S): at 23:51

## 2021-05-17 RX ADMIN — Medication 125 MILLIGRAM(S): at 20:51

## 2021-05-17 NOTE — ED ADULT TRIAGE NOTE - CHIEF COMPLAINT QUOTE
Pt presents to ER c/o left sided chest pressure, LE swelling and weakness. Onset of symptoms began 3-4 days PTA. Pt was seen at Cardiologist and was recommended to come to ER r/t elevated D-dimer. Allergic to contrast

## 2021-05-17 NOTE — ED PROVIDER NOTE - CARE PLAN
Principal Discharge DX:	Sinus tachycardia  Secondary Diagnosis:	Chest pain   Principal Discharge DX:	Hyperthyroidism  Secondary Diagnosis:	Chest pain  Secondary Diagnosis:	Sinus tachycardia

## 2021-05-17 NOTE — ED PROVIDER NOTE - ATTENDING CONTRIBUTION TO CARE
I, Xi Washington MD,  performed the initial face to face bedside interview with this patient regarding history of present illness, review of symptoms and relevant past medical, social and family history.  I completed an independent physical examination.  I was the initial provider who evaluated this patient. I have signed out the follow up of any pending tests (i.e. labs, radiological studies) to the ACP.  I have communicated the patient’s plan of care and disposition with the ACP.  The history, relevant review of systems, past medical and surgical history, medical decision making, and physical examination was documented by the scribe in my presence and I attest to the accuracy of the documentation.

## 2021-05-17 NOTE — ED STATDOCS - PROGRESS NOTE DETAILS
Eduardo Ramirez for attending Dr. Villa: 55 y/o female with a PMHx of HLD, cervicogenic migraine, herniated disc, hyperparathyroidism, renal calculus, presents to the ED sent by cardiologist c/o left-sided chest pressure and b/l LE swelling and weakness x3-4 days. Pt reports chest pain radiating to left arm. Pt was seen at Cardiologist Dr. Todd KEITH and was told to come to the ED due to elevated D-dimer. Notes IV contrast allergy (rash). Pt tachycardic, needs to pre-medicated for IV contrast, may have PE. Will send pt to main ED for further evaluation.

## 2021-05-17 NOTE — ED PROVIDER NOTE - PROGRESS NOTE DETAILS
Patient examined at bedside with Dr. Washington. 57 y/o F with PMH of HLD, kidney stones, COVID-19 in 2/2021 presents with CP and LE swelling x 3 weeks. Pt went to see Dr. Brooks, had LE US which was negative for DVT, Non-contrast CT chest which was negative, but reported to have elevated d-dimer. Was advised to go to ED for evaluation. Pt states she has IV contrast allergy, noting rash with administration of IV contrast 25 years ago. PE: Anxious appearing. Cardiac: s1s2, tachycardic. Lungs: CTAB. PV: bilateral LE non-pitting edema. A/P: r/o PE. Will pre-medicate with benadryl and solumedrol. PLan for CTA chest, labs, EKG, reassess. - Yimi Sanchez PA-C Workup essentially negative.  Gave patient empiric IVF bolus, without improvement.  Denies bloody or black bowel movements with normal hemoglobin.  Denies thyroid disease, however PMHx positive for hyperparathyroidsim.  TSH added on.  Plan for tele admission, cardiology eval in AM. Shan Blackmon D.O. TSH <0.01.  Dr. Hodge in room speaking with patient, discussed with him. Shan Blackmon D.O.

## 2021-05-17 NOTE — ED PROVIDER NOTE - OBJECTIVE STATEMENT
55 y/o female with a PMHx of HLD, cervicogenic migraine, herniated disc, hyperparathyroidism, renal calculus, COVID-19 February 2021 presents to the ED sent by cardiologist c/o left-sided chest pressure and b/l LE swelling and weakness x3-4 days. Pt reports chest pain radiating to left arm. Pt was seen at Cardiologist Dr. Todd KEITH and was told to come to the ED due to elevated D-dimer. Notes IV contrast allergy (rash). Pt tachycardic, needs to pre-medicated for IV contrast, may have PE. known drug allergy to Advil, Bactrim, sulfa drugs. No other complaints at this time.

## 2021-05-17 NOTE — ED PROVIDER NOTE - CLINICAL SUMMARY MEDICAL DECISION MAKING FREE TEXT BOX
PE ruled out.  Cardiac enzymes WNL.  TSH added, which is low.  Likely hyperthyroidism as cause, admit to medicine.

## 2021-05-17 NOTE — ED ADULT NURSE NOTE - OBJECTIVE STATEMENT
pt presents to the ED c/o L sided chest pain radiating down L arm with associated bilateral LE swelling x 4 days. Pt states she was sent in by cardiologist to r/o PE d/t elevated D-dimer. Pt is also tachycardic states she is having a hard time taking a deep breath without having to cough. Pt A+Ox4 following commands appropriately. Pt states she has an allergy to contrast dye-states about 25 years ago she developed a rash down bilateral arms after getting contrast dye. However denied any respiratory distress or swelling of throat/tongue. Pt given call bell and all safety measures in place.

## 2021-05-18 ENCOUNTER — TRANSCRIPTION ENCOUNTER (OUTPATIENT)
Age: 56
End: 2021-05-18

## 2021-05-18 VITALS — HEART RATE: 103 BPM | DIASTOLIC BLOOD PRESSURE: 62 MMHG | SYSTOLIC BLOOD PRESSURE: 109 MMHG

## 2021-05-18 DIAGNOSIS — R00.0 TACHYCARDIA, UNSPECIFIED: ICD-10-CM

## 2021-05-18 LAB
ADD ON TEST-SPECIMEN IN LAB: SIGNIFICANT CHANGE UP
ANION GAP SERPL CALC-SCNC: 6 MMOL/L — SIGNIFICANT CHANGE UP (ref 5–17)
BUN SERPL-MCNC: 18 MG/DL — SIGNIFICANT CHANGE UP (ref 7–23)
CALCIUM SERPL-MCNC: 10.3 MG/DL — HIGH (ref 8.5–10.1)
CHLORIDE SERPL-SCNC: 111 MMOL/L — HIGH (ref 96–108)
CO2 SERPL-SCNC: 25 MMOL/L — SIGNIFICANT CHANGE UP (ref 22–31)
CREAT SERPL-MCNC: 0.64 MG/DL — SIGNIFICANT CHANGE UP (ref 0.5–1.3)
GLUCOSE SERPL-MCNC: 225 MG/DL — HIGH (ref 70–99)
HCT VFR BLD CALC: 37.1 % — SIGNIFICANT CHANGE UP (ref 34.5–45)
HGB BLD-MCNC: 11.8 G/DL — SIGNIFICANT CHANGE UP (ref 11.5–15.5)
MAGNESIUM SERPL-MCNC: 1.9 MG/DL — SIGNIFICANT CHANGE UP (ref 1.6–2.6)
MCHC RBC-ENTMCNC: 26.7 PG — LOW (ref 27–34)
MCHC RBC-ENTMCNC: 31.8 GM/DL — LOW (ref 32–36)
MCV RBC AUTO: 83.9 FL — SIGNIFICANT CHANGE UP (ref 80–100)
PHOSPHATE SERPL-MCNC: 4.5 MG/DL — SIGNIFICANT CHANGE UP (ref 2.5–4.5)
PLATELET # BLD AUTO: 324 K/UL — SIGNIFICANT CHANGE UP (ref 150–400)
POTASSIUM SERPL-MCNC: 4.3 MMOL/L — SIGNIFICANT CHANGE UP (ref 3.5–5.3)
POTASSIUM SERPL-SCNC: 4.3 MMOL/L — SIGNIFICANT CHANGE UP (ref 3.5–5.3)
RBC # BLD: 4.42 M/UL — SIGNIFICANT CHANGE UP (ref 3.8–5.2)
RBC # FLD: 12.1 % — SIGNIFICANT CHANGE UP (ref 10.3–14.5)
SODIUM SERPL-SCNC: 142 MMOL/L — SIGNIFICANT CHANGE UP (ref 135–145)
TROPONIN I SERPL-MCNC: <0.015 NG/ML — SIGNIFICANT CHANGE UP (ref 0.01–0.04)
WBC # BLD: 5.95 K/UL — SIGNIFICANT CHANGE UP (ref 3.8–10.5)
WBC # FLD AUTO: 5.95 K/UL — SIGNIFICANT CHANGE UP (ref 3.8–10.5)

## 2021-05-18 PROCEDURE — 83735 ASSAY OF MAGNESIUM: CPT

## 2021-05-18 PROCEDURE — 86769 SARS-COV-2 COVID-19 ANTIBODY: CPT

## 2021-05-18 PROCEDURE — 84235 ASSAY OF ENDOCRINE HORMONE: CPT

## 2021-05-18 PROCEDURE — 76536 US EXAM OF HEAD AND NECK: CPT | Mod: 26

## 2021-05-18 PROCEDURE — 85027 COMPLETE CBC AUTOMATED: CPT

## 2021-05-18 PROCEDURE — 99223 1ST HOSP IP/OBS HIGH 75: CPT

## 2021-05-18 PROCEDURE — 84481 FREE ASSAY (FT-3): CPT

## 2021-05-18 PROCEDURE — 99236 HOSP IP/OBS SAME DATE HI 85: CPT

## 2021-05-18 PROCEDURE — G1004: CPT

## 2021-05-18 PROCEDURE — 36415 COLL VENOUS BLD VENIPUNCTURE: CPT

## 2021-05-18 PROCEDURE — 76536 US EXAM OF HEAD AND NECK: CPT

## 2021-05-18 PROCEDURE — 84445 ASSAY OF TSI GLOBULIN: CPT

## 2021-05-18 PROCEDURE — 84439 ASSAY OF FREE THYROXINE: CPT

## 2021-05-18 PROCEDURE — 84100 ASSAY OF PHOSPHORUS: CPT

## 2021-05-18 PROCEDURE — 71275 CT ANGIOGRAPHY CHEST: CPT | Mod: 26,ME

## 2021-05-18 PROCEDURE — 80048 BASIC METABOLIC PNL TOTAL CA: CPT

## 2021-05-18 RX ORDER — PROPRANOLOL HCL 160 MG
1 CAPSULE, EXTENDED RELEASE 24HR ORAL
Qty: 90 | Refills: 0
Start: 2021-05-18 | End: 2021-06-16

## 2021-05-18 RX ORDER — ATENOLOL 25 MG/1
25 TABLET ORAL ONCE
Refills: 0 | Status: COMPLETED | OUTPATIENT
Start: 2021-05-18 | End: 2021-05-18

## 2021-05-18 RX ORDER — METHIMAZOLE 10 MG/1
2 TABLET ORAL
Qty: 60 | Refills: 0
Start: 2021-05-18 | End: 2021-06-16

## 2021-05-18 RX ORDER — MULTIVIT-MIN/FERROUS GLUCONATE 9 MG/15 ML
1 LIQUID (ML) ORAL
Qty: 0 | Refills: 0 | DISCHARGE

## 2021-05-18 RX ORDER — ATENOLOL 25 MG/1
25 TABLET ORAL
Refills: 0 | Status: DISCONTINUED | OUTPATIENT
Start: 2021-05-18 | End: 2021-05-18

## 2021-05-18 RX ORDER — ENOXAPARIN SODIUM 100 MG/ML
40 INJECTION SUBCUTANEOUS DAILY
Refills: 0 | Status: DISCONTINUED | OUTPATIENT
Start: 2021-05-18 | End: 2021-05-18

## 2021-05-18 RX ORDER — SODIUM CHLORIDE 9 MG/ML
1000 INJECTION INTRAMUSCULAR; INTRAVENOUS; SUBCUTANEOUS ONCE
Refills: 0 | Status: COMPLETED | OUTPATIENT
Start: 2021-05-18 | End: 2021-05-18

## 2021-05-18 RX ADMIN — SODIUM CHLORIDE 1000 MILLILITER(S): 9 INJECTION INTRAMUSCULAR; INTRAVENOUS; SUBCUTANEOUS at 02:25

## 2021-05-18 RX ADMIN — ATENOLOL 25 MILLIGRAM(S): 25 TABLET ORAL at 07:24

## 2021-05-18 NOTE — DISCHARGE NOTE PROVIDER - NSDCMRMEDTOKEN_GEN_ALL_CORE_FT
methIMAzole 10 mg oral tablet: 2 tab(s) orally once a day  propranolol 20 mg oral tablet: 1 tab(s) orally every 8 hours  Vitamin B-100 oral tablet: 1 tab(s) orally once a day

## 2021-05-18 NOTE — DISCHARGE NOTE NURSING/CASE MANAGEMENT/SOCIAL WORK - PATIENT PORTAL LINK FT
You can access the FollowMyHealth Patient Portal offered by [facility] by registering at the following website: [email or web address]. By joining [employer]’s FollowMyHealth portal, you will also be able to view your health information using other applications (apps) compatible with our system.

## 2021-05-18 NOTE — H&P ADULT - ASSESSMENT
55 yo F with a PMH HLD (not on meds), migraines, frequent kidney stones, ??hyperparathyroidism, and COVID-19 who presents with chest tightness and palpitations, found to have an elevated TSH.    1) (Suspected) Hyperthyroidism/_______________________    2) History of COVID-19    3) Prophylactic measure  - DVT PPX: IMPROVE score of 0, but will give Lovenox 40 mg SQ QD  - Diet: regular  - Dispo: pending evaluation above and improvement in sxs 57 yo F with a PMH HLD (not on meds), migraines, frequent kidney stones, ??hyperparathyroidism, and COVID-19 who presents with chest tightness and palpitations, found to have an elevated TSH.    1) (Overt) Hyperthyroidism  - Patient has significant tachycardia to 130s-140s, TSH low, with free T4 markedly elevated  - No goiter, exophthalmos seen  - Interestingly, patient endorses some recent leg swelling, weight gain, and hair loss that would be expected with hypothyroidism  - Unclear etiology, may be due to recent ?COVID  - Possible history of ?hyperparathyroidism in the past, reportedly saw an Endocrinologist in the past  - Currently asymptomatic and not clinically in thyroid storm, although is at risk for decompensation  - Will admit to CICU  - Start Atenolol 25 mg BID  - Neck US  - Monitor vitals Q2H  - Cardiology consult    2) History of COVID-19  - No evidence of PE despite mildly elevated D-dimer  - Pulmonary findings resolved  - Currently COVID-19 negative, no need for isolation    3) Prophylactic measure  - DVT PPX: IMPROVE score of 0, but will give Lovenox 40 mg SQ QD  - Diet: regular  - Dispo: pending evaluation above and improvement in sxs

## 2021-05-18 NOTE — ED ADULT NURSE REASSESSMENT NOTE - NS ED NURSE REASSESS COMMENT FT1
Pt discharged to home. Saline lock removed-no active bleeding. Discharge instructions reviewed and medications reviewed and prescriptions sent. Pt told to follow up with endocrinologist in a week. Pt ambulated to [facility] with RN. Discharge complete.
Pt received alert and oriented VSS afebrile. HR is 101 NSR. Pt resting comfortably, denies chest pain or SOB. Pt updated on plan of care-pt patient "I am going home today, it is my sons graduation tomorrow", doctors are aware-no orders for dc at this time. Pt given menu to order lunch. Pt OOB ambulating to bathroom independent, gait steady. All needs addressed and safety maintained. Call bell in reach.
Pt sleeping at this time awaiting ultrasound. Pt still tachycardic, however resting comfortably at this time. Pt offers no complaints. Doctor Naveen aware of pt's HR and pharmacy states they will send up atenolol. Pt given call bell and all safety measures in place at this time.
RN reached out to Dr. Orellana concerning plan of care for patient, MD waiting for endocrinologist to call back. MD aware pt wants to be discharged to home today.
doctor brannon aware of pt's HR. Pt refusing rectal temp however states she does not think she has a fever. Plan to get CT results back and then give fluids. Pt continue to be on cardiac monitor and call bell within reach
Assumed care from RN Luci, pt HR in the 130s, atenolol given for tachycardia.  Pt denies chest pain only complaining of slight cough.  Pt updated on POC, will monitor HR.

## 2021-05-18 NOTE — H&P ADULT - NSICDXPASTMEDICALHX_GEN_ALL_CORE_FT
PAST MEDICAL HISTORY:  Cervicogenic Migraine     Herniated Disc     Hyperlipidemia     Hyperparathyroidism ??    Renal Calculus or Stone

## 2021-05-18 NOTE — H&P ADULT - NSHPREVIEWOFSYSTEMS_GEN_ALL_CORE
Gen: + weight loss then weight gain, malaise, fatigue. Negative for fevers or chills  Eyes: no blurred vision or lacrimation  ENT: no tinnitus, vertigo, or decreased hearing  Resp: + pleuritic chest pain, cough, ?dyspnea. No wheezing, hemoptysis, or orthopnea  CV: + chest pain, palpitations. No dyspnea on exertion  GI: no nausea, vomiting, abdominal pain, diarrhea, melena, or hematochezia  : no dysuria, hematuria, or incontinence  MSK: no arthralgias, joint swelling, or myalgias  Neuro: no focal deficits, confusion, dizziness, tremors, or seizures  Skin: + edema. No rash or lesions

## 2021-05-18 NOTE — DISCHARGE NOTE PROVIDER - CARE PROVIDER_API CALL
Kassy Harrison)  EndocrinologyMetabDiabetes; Internal Medicine  [address] [place] [address]  Phone: [phone]  Fax: [phone]  Follow Up Time: 1 week    Dalton Brooks)  Cardiovascular Disease; Internal Medicine  [address] [place] [address]  Phone: [phone]  Fax: [phone]  Follow Up Time: Routine

## 2021-05-18 NOTE — H&P ADULT - NSHPLABSRESULTS_GEN_ALL_CORE
Labs personally reviewed and interpreted. Notable for no leukocytosis (WBC 5.18), left shift, or lymphopenia. Hb 11.4, 293, INR 1.06, Na 143, K 3.9, Cl 110, HCO3 29, BUN/creatinine 17/0.61, BG 88, calcium 9.9 with albumin 3.3, LFTs wnl, lipase 149 (normal), d-dimer slightly elevated at 334, troponin negative x2, proBNP minimally elevated at 165, and TSH markedly low at < 0.01.  RVP/COVID-19 PCR result negative.    Labs/imaging from Dr. Brooks's office viewed on patient's phone with her permission. Notable for normal EF, but interventricular hypertrophy, not asymmetric. TSH < 0.005. Troponin negative. Bilateral venous duplex negative for DVT.    CXR personally reviewed and interpreted. Notable for clear lungs, no focal consolidations, effusions, interstitial markings, pneumothorax, or obvious cardiomegaly.  CTA chest personally reviewed and interpreted. Notable for no pulmonary embolism. Interval resolution of peripheral ground-glass opacities compared with prior exam from 2/17/2021. No acute parenchymal or pleural lung disease.    EKG personally reviewed. Sinus tachycardia, normal axis. Small new but non-pathological Q waves in leads I and aVL. No ST changes. Rate 114, , QTc 421. Labs personally reviewed and interpreted. Notable for no leukocytosis (WBC 5.18), left shift, or lymphopenia. Hb 11.4, 293, INR 1.06, Na 143, K 3.9, Cl 110, HCO3 29, BUN/creatinine 17/0.61, BG 88, calcium 9.9 with albumin 3.3, LFTs wnl, lipase 149 (normal), d-dimer slightly elevated at 334, troponin negative x2, proBNP minimally elevated at 165, TSH markedly low at < 0.01, and free T4 markedly elevated at 7.15.  RVP/COVID-19 PCR result negative.    Labs/imaging from Dr. Brooks's office viewed on patient's phone with her permission. Notable for normal EF, but interventricular hypertrophy, not asymmetric. TSH < 0.005. Troponin negative. Bilateral venous duplex negative for DVT. Lipid panel normal.    CXR personally reviewed and interpreted. Notable for clear lungs, no focal consolidations, effusions, interstitial markings, pneumothorax, or obvious cardiomegaly.  CTA chest personally reviewed and interpreted. Notable for no pulmonary embolism. Interval resolution of peripheral ground-glass opacities compared with prior exam from 2/17/2021. No acute parenchymal or pleural lung disease.    EKG personally reviewed. Sinus tachycardia, normal axis. Small new but non-pathological Q waves in leads I and aVL. No ST changes. Rate 114, , QTc 421.

## 2021-05-18 NOTE — H&P ADULT - NSHPSOCIALHISTORY_GEN_ALL_CORE
No smoking or drug use history.  Occasional alcohol use (once a week).  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  Lives with her . Has a son in college.

## 2021-05-18 NOTE — DISCHARGE NOTE PROVIDER - HOSPITAL COURSE
57 yo F with a PMH HLD (not on meds), migraines, frequent kidney stones, ??hyperparathyroidism, and COVID-19 who presents with chest tightness. She says the past 4 days, she has had intermittent periods of left upper chest tightness, palpitations, and tingling on the bottom of her left arm. She says the symptoms occur at rest and on exertion and last a few seconds to a couple minutes at a time. She is not sure if she has shortness of breath, but when she tries to hold her breath, she gets a dry cough. Her chest tightness is pleuritic. She has felt more fatigue when walking up stairs during this time. She also had marked swelling during this same time, then improved yesterday but worsened slightly when she came to the ED. She also notes hair loss during the past few days as well. She had lost 15 pounds when she had COVID a few months ago, but gained about half of it back. She denies nausea, vomiting, abdominal pain, diarrhea, blood in her stool, urinary complaints, difficulty eating or swallowing, blurry vision, fevers, or chills. She feels her respiratory symptoms are similar to when she had COVID.  She went to her cardiologist, who performed a TTE, venous LE US, and blood-work. He called her yesterday to tell her to come to the ED due to an elevated D-dimer to r/o a PE.   He was considering scheduling a stress test for her in 1 week.      HOSPITAL COURSE:   1) (Overt) Hyperthyroidism  - Patient has significant tachycardia to 130s-140s, TSH low, with free T4 markedly elevated  - No goiter, exophthalmos seen  - Interestingly, patient endorses some recent leg swelling, weight gain, and hair loss that would be expected with hypothyroidism  - Unclear etiology, may be due to recent ?COVID  - Possible history of ?hyperparathyroidism in the past, reportedly saw an Endocrinologist in the past  - Currently asymptomatic and not clinically in thyroid storm, although is at risk for decompensation  - Will admit to CICU  - Start Atenolol 25 mg BID  - Neck US  - Monitor vitals Q2H  - Cardiology consulted - discussed with deangelo Bethea for dc   - also discussed with ENdocrinology and call and advised to start methimazole and will change atenolol to propranolol  Dr Harrison has already reached out to the patient for an OP appt.      2) History of COVID-19  - No evidence of PE despite mildly elevated D-dimer  - Pulmonary findings resolved  - Currently COVID-19 negative, no need for isolation    3) Prophylactic measure  - DVT PPX: IMPROVE score of 0, but will give Lovenox 40 mg SQ QD  - Diet: regular  - Dispo: pending evaluation above and improvement in sxs    discussed with RN and  at bedside  Pt voices understanding and will see Dr Harrison   time spent on discharge - 55 mins     OTHER DETAILS:  5/18 - feels better, no cp palps sob orlightheadedness  PHYSICAL EXAM:  GENERAL: NAD, able to lie flat in bed  HEAD:  Atraumatic, Normocephalic  EYES: EOMI, PERRLA, normal sclera  ENT: Moist mucous membranes  NECK: Supple, No JVD, no nuchal rigidity  CHEST/LUNG: Clear to auscultation bilaterally; No rales, rhonchi, wheezing, or rubs. Unlabored respirations  HEART: Regular rate and rhythm; No murmurs, rubs, or gallops  ABDOMEN: Bowel sounds present; Soft, Nontender, Nondistended. No hepatomegaly  EXTREMITIES:  no pitting bilaterally  NERVOUS SYSTEM:  Alert & Oriented X3, speech clear. No focal motor or sensory deficits  MSK: FROM all 4 extremities, full and equal strength  SKIN: No rashes or lesions    < from: US Thyroid + Parathyroid (05.18.21 @ 10:05) >  No thyroid nodules.  Heterogeneous parenchymal echotexture which may be due to thyroiditis.    time spent on discharge - 55 mins

## 2021-05-18 NOTE — CONSULT NOTE ADULT - SUBJECTIVE AND OBJECTIVE BOX
Patient is a 56y old  Female who presents with a chief complaint of tachycardia, chest pain, ?hyperthyroidism (18 May 2021 04:58)    ________________________________  RAFAEL SNYDER is a 56y year old Female with a past medical history of PMHx COVID-19 diagnosed in February requiring hospitalization [facility] and home oxygen, history of hyperlipidemia, migraines,   who was seen in our office yesterday, with symptoms of chest discomfort, shortness of breath with tachycardia.     She underwent an echo showing preserved LVEF, with mild LVH and normal portal pressures.  Given her tachycardia, with elevated D-dimer she was sent to the ER for further evaluation.    She underwent a CTA showing no PE.  She was on the sinus tachycardia in arrival.  T4 was elevated consistent with hypothyroid.       In the ED, she was given Diphenhydramine 50 mg IV x1 and Methylprednisolone 125 mg IV x1 as PPX for her CTA with contrast, as well as NS 1L x1.      PREVIOUS CARDIAC WORKUP:    Echocardiogram:  5/18/2021  --There is mild left ventricular hypertrophy.  --LV global wall motion is normal.  --LV ejection fraction (60 %) is normal.  --There is trace mitral regurgitation.  --There is mild tricuspid regurgitation.  --The right atrial pressure is normal (0 - 5 mm Hg). There is no pulmonary hypertension.  ________________________________  Review of systems: A 10 point review of system has been performed, and is negative except for what has been mentioned in the above history of present illness.     PAST MEDICAL & SURGICAL HISTORY:  Hyperparathyroidism  ??    Renal Calculus or Stone    Herniated Disc    Cervicogenic Migraine    Hyperlipidemia    Breast Reduction    Status Post LASIK Surgery    Urethral Stone  s/p urethral stents, multiple      FAMILY HISTORY:  Family history of myocardial infarction (Father)    Family history of Alzheimer&#x27;s disease (Mother)    SOCIAL HISTORY: The patient denies any history of tobacco abuse, alcohol abuse or illicit drug use.    ALLERGIES:  Advil (Unknown)  Bactrim (Unknown)  contrast dye (Rash)  sulfa drugs (Rash)    Home Medications:  Vitamin B-100 oral tablet: 1 tab(s) orally once a day (18 May 2021 05:08)    MEDICATIONS  (STANDING):  ATENolol  Tablet 25 milliGRAM(s) Oral two times a day  enoxaparin Injectable 40 milliGRAM(s) SubCutaneous daily    MEDICATIONS  (PRN):    Vital Signs Last 24 Hrs  T(C): 36.7 (18 May 2021 11:24), Max: 37 (17 May 2021 23:55)  T(F): 98 (18 May 2021 11:24), Max: 98.6 (17 May 2021 23:55)  HR: 105 (18 May 2021 11:24) (101 - 136)  BP: 124/61 (18 May 2021 11:24) (107/56 - 155/77)  BP(mean): 76 (18 May 2021 11:24) (67 - 117)  RR: 19 (18 May 2021 11:24) (15 - 21)  SpO2: 92% (18 May 2021 11:24) (92% - 99%)  I&O's Summary    ________________________________  GENERAL APPEARANCE:  No acute distress  HEAD: normocephalic, atraumatic  NECK: supple, no jugular venous distention, no carotid bruit    HEART: Regular rate and rhythm, S1, S2 normal, 1/6 regurgitant murmur    CHEST:  No anterior chest wall tenderness    LUNGS:  Clear to auscultation, without any wheezing, rhonchi or rales    ABDOMEN: soft, nontender, nondistended, with positive bowel sounds appreciated  EXTREMITIES: no clubbing, cyanosis, or edema.   NEURO: Alert and oriented x3  PSYC:  Normal affect  SKIN:  Dry  ________________________________   TELEMETRY: Sinus rhythm with sinus tachycardia    ECG: Sinus tachycardia     LABS:                        11.8   5.95  )-----------( 324      ( 18 May 2021 08:31 )             37.1             05-18    142  |  111<H>  |  18  ----------------------------<  225<H>  4.3   |  25  |  0.64    Ca    10.3<H>      18 May 2021 08:31  Phos  4.5     05-18  Mg     1.9     05-18    TPro  7.0  /  Alb  3.3  /  TBili  0.2  /  DBili  x   /  AST  19  /  ALT  32  /  AlkPhos  77  05-17      LIVER FUNCTIONS - ( 17 May 2021 20:08 )  Alb: 3.3 g/dL / Pro: 7.0 gm/dL / ALK PHOS: 77 U/L / ALT: 32 U/L / AST: 19 U/L / GGT: x         PT/INR - ( 17 May 2021 20:08 )   PT: 12.4 sec;   INR: 1.06 ratio         PTT - ( 17 May 2021 20:08 )  PTT:34.7 sec    05-18 @ 01:20  Trop-I  <0.015  CK      37  CK-MB   --    05-17 @ 20:08  Trop-I  <0.015  CK      --  CK-MB   --    Pro BNP  165 05-17 @ 20:08  D Dimer  334 05-17 @ 20:08    PT/INR - ( 17 May 2021 20:08 )   PT: 12.4 sec;   INR: 1.06 ratio         PTT - ( 17 May 2021 20:08 )  PTT:34.7 sec      RAFAEL SNYDER  CARDIAC MARKERS ( 18 May 2021 01:20 )  <0.015 ng/mL / x     / 37 U/L / x     / x      CARDIAC MARKERS ( 17 May 2021 20:08 )  <0.015 ng/mL / x     / x     / x     / x          ________________________________    RADIOLOGY & ADDITIONAL STUDIES: No PE on CTA  ________________________________    ASSESSMENT:  Tachycardia-likely due to hypothyroid state   Hyperthyroidism  History of COVID-19  Chest discomfort or shortness of breath-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  No need for repeat echo.  PE study negative.  Outpatient stress testing Holter monitor for monitoring of hyperthyroid induced atrial fibrillation.  No need for anticoagulation.  Discussed with patient and primary attending.    __________________________________________________________________________  Thank you for allowing me to participate in the care of your patient. Please contact me should any questions arise.    JOSUE Duarte DO, [facility]  [phone]  b

## 2021-05-18 NOTE — DISCHARGE NOTE PROVIDER - NSDCCPCAREPLAN_GEN_ALL_CORE_FT
PRINCIPAL DISCHARGE DIAGNOSIS  Diagnosis: Hyperthyroidism  Assessment and Plan of Treatment: continue methimazole and propranolol. See Dr Harrison

## 2021-05-18 NOTE — H&P ADULT - HISTORY OF PRESENT ILLNESS
55 yo F with a PMH HLD (not on meds), migraines, frequent kidney stones, ??hyperparathyroidism, and COVID-19 who presents with chest tightness. She says the past 4 days, she has had intermittent periods of left upper chest tightness, palpitations, and tingling on the bottom of her left arm. She says the symptoms occur at rest and on exertion and last a few seconds to a couple minutes at a time. She is not sure if she has shortness of breath, but when she tries to hold her breath, she gets a dry cough. Her chest tightness is pleuritic. She has felt more fatigue when walking up stairs during this time. She also had marked swelling during this same time, then improved yesterday but worsened slightly when she came to the ED. She also notes hair loss during the past few days as well. She had lost 15 pounds when she had COVID a few months ago, but gained about half of it back. She denies nausea, vomiting, abdominal pain, diarrhea, blood in her stool, urinary complaints, difficulty eating or swallowing, blurry vision, fevers, or chills. She feels her respiratory symptoms are similar to when she had COVID.  She went to her cardiologist, who performed a TTE, venous LE US, and blood-work. He called her yesterday to tell her to come to the ED due to an elevated D-dimer to r/o a PE. He was considering scheduling a stress test for her in 1 week.    She does not recall a history of hyperparathyroidism, but it was documented in her chart. However, she does note she saw an Endocrinologist about 20 years ago due to her frequent kidney stones.  She does not take medications, vitamins, or supplements other than a B-complex vitamin.    In the ED, she was given Diphenhydramine 50 mg IV x1 and Methylprednisolone 125 mg IV x1 as PPX for her CTA with contrast, as well as NS 1L x1.

## 2021-05-18 NOTE — DISCHARGE NOTE PROVIDER - PROVIDER TOKENS
PROVIDER:[TOKEN:[[id number]],FOLLOWUP:[1 week]],PROVIDER:[TOKEN:[[id number]],FOLLOWUP:[Routine]]

## 2021-05-18 NOTE — H&P ADULT - NSHPPHYSICALEXAM_GEN_ALL_CORE
Vital Signs Last 24 Hrs  T(C): 36.7 (17 May 2021 19:40), Max: 36.7 (17 May 2021 19:40)  T(F): 98.1 (17 May 2021 19:40), Max: 98.1 (17 May 2021 19:40)  HR: 121 (17 May 2021 23:55) (114 - 126)  BP: 155/77 (17 May 2021 23:55) (124/64 - 155/77)  BP(mean): 117 (17 May 2021 19:40) (117 - 117)  RR: 16 (17 May 2021 23:55) (16 - 20)  SpO2: 96% (17 May 2021 23:55) (96% - 99%)    GENERAL: No acute distress  HEENT: PERRL, EOMI, MM dry, no oropharyngeal lesions  NECK: supple, no stiffness, no JVD, no thyromegaly or goiter  PULM: respirations non-labored, clear to auscultation bilaterally, no rales, rhonchi, or wheezes  CV: tachycardic with regular rhythm, no murmurs, gallops, or rubs  GI: abdomen soft, nontender, nondistended, no masses felt, normal bowel sounds  MSK: strength 5/5 bilateral upper/lower extremities. No joint swelling, erythema, or warmth  LYMPH: no anterior cervical, posterior cervical, supraclavicular, or inguinal lymphadenopathy  NEURO: A&Ox3, no tremors, sensation intact  SKIN: + trace bilateral edema near the ankles. No rashes or lesions

## 2021-05-19 ENCOUNTER — APPOINTMENT (OUTPATIENT)
Dept: DISASTER EMERGENCY | Facility: OTHER | Age: 56
End: 2021-05-19

## 2021-05-20 LAB
TSH RECEP AB FLD-ACNC: 13.3 IU/L — HIGH (ref 0–1.75)
TSI ACT/NOR SER: 7.81 IU/L — HIGH (ref 0–0.55)

## 2021-05-25 DIAGNOSIS — R07.89 OTHER CHEST PAIN: ICD-10-CM

## 2021-05-25 DIAGNOSIS — Z87.442 PERSONAL HISTORY OF URINARY CALCULI: ICD-10-CM

## 2021-05-25 DIAGNOSIS — Z88.2 ALLERGY STATUS TO SULFONAMIDES: ICD-10-CM

## 2021-05-25 DIAGNOSIS — Z82.49 FAMILY HISTORY OF ISCHEMIC HEART DISEASE AND OTHER DISEASES OF THE CIRCULATORY SYSTEM: ICD-10-CM

## 2021-05-25 DIAGNOSIS — E78.5 HYPERLIPIDEMIA, UNSPECIFIED: ICD-10-CM

## 2021-05-25 DIAGNOSIS — R00.0 TACHYCARDIA, UNSPECIFIED: ICD-10-CM

## 2021-05-25 DIAGNOSIS — Z88.1 ALLERGY STATUS TO OTHER ANTIBIOTIC AGENTS STATUS: ICD-10-CM

## 2021-05-25 DIAGNOSIS — G43.809 OTHER MIGRAINE, NOT INTRACTABLE, WITHOUT STATUS MIGRAINOSUS: ICD-10-CM

## 2021-05-25 DIAGNOSIS — E05.90 THYROTOXICOSIS, UNSPECIFIED WITHOUT THYROTOXIC CRISIS OR STORM: ICD-10-CM

## 2021-05-25 DIAGNOSIS — Z91.041 RADIOGRAPHIC DYE ALLERGY STATUS: ICD-10-CM

## 2021-05-25 DIAGNOSIS — Z81.8 FAMILY HISTORY OF OTHER MENTAL AND BEHAVIORAL DISORDERS: ICD-10-CM

## 2021-05-25 DIAGNOSIS — Z86.16 PERSONAL HISTORY OF COVID-19: ICD-10-CM

## 2021-05-25 DIAGNOSIS — Z98.890 OTHER SPECIFIED POSTPROCEDURAL STATES: ICD-10-CM

## 2021-05-25 DIAGNOSIS — Z88.6 ALLERGY STATUS TO ANALGESIC AGENT: ICD-10-CM

## 2023-01-31 PROBLEM — Z00.00 ENCOUNTER FOR PREVENTIVE HEALTH EXAMINATION: Status: ACTIVE | Noted: 2023-01-31

## 2023-02-08 ENCOUNTER — APPOINTMENT (OUTPATIENT)
Dept: OPHTHALMOLOGY | Facility: CLINIC | Age: 58
End: 2023-02-08
Payer: COMMERCIAL

## 2023-02-08 ENCOUNTER — NON-APPOINTMENT (OUTPATIENT)
Age: 58
End: 2023-02-08

## 2023-02-08 PROCEDURE — 99204 OFFICE O/P NEW MOD 45 MIN: CPT

## 2023-11-13 NOTE — ED ADULT NURSE NOTE - NSHOSCREENINGQ1_ED_ALL_ED
AMI Week 1 Survey      Flowsheet Row Responses   [facility] [place], [facility] by [facility] facility patient discharged from? [place]   Does the patient have one of the following disease processes/diagnoses(primary or secondary)? Acute MI (STEMI,NSTEMI)   Week 1 attempt successful? No   Unsuccessful attempts Attempt 1            Marissa GIBBS - Registered Nurse  
No

## 2024-06-06 NOTE — PATIENT PROFILE ADULT - NSPROPTRIGHTNOTIFY_GEN_A_NUR
Follow up with Dr. Story in 6-8 months. Will repeat non fasting blood work and random urine tests Porsha panel    No medication changes    Increased fluid intake to 64-70 oz daily    Low sodium less than 2,000 mg daily   
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 Plan: Will refer to [facility] for a consultation, and continue to monitor.
declines

## 2025-03-07 NOTE — PROGRESS NOTE ADULT - REASON FOR ADMISSION
COVID-19, intractable nausea and vomiting
[TextEntry] : Physical Examination: General: Not in acute distress Head: Normocephalic, no lesions Eyes:EOMI, Fundi normal Throat: Clear, no exudates or lesions Chest: Slight Crackles , No SOB CVS: S1/S2, RR, no murmurs, no rubs Abdomen: Soft NT/ND, +BS Extremities: No edema, ulcers, or cyanosis Neurological: Alert, awake, no gross focal deficits
COVID-19, intractable nausea and vomiting